# Patient Record
Sex: FEMALE | Race: BLACK OR AFRICAN AMERICAN | Employment: PART TIME | URBAN - METROPOLITAN AREA
[De-identification: names, ages, dates, MRNs, and addresses within clinical notes are randomized per-mention and may not be internally consistent; named-entity substitution may affect disease eponyms.]

---

## 2017-09-01 ENCOUNTER — OP HISTORICAL/CONVERTED ENCOUNTER (OUTPATIENT)
Dept: OTHER | Age: 72
End: 2017-09-01

## 2018-09-27 ENCOUNTER — OP HISTORICAL/CONVERTED ENCOUNTER (OUTPATIENT)
Dept: OTHER | Age: 73
End: 2018-09-27

## 2018-10-08 ENCOUNTER — OP HISTORICAL/CONVERTED ENCOUNTER (OUTPATIENT)
Dept: OTHER | Age: 73
End: 2018-10-08

## 2018-11-06 ENCOUNTER — OP HISTORICAL/CONVERTED ENCOUNTER (OUTPATIENT)
Dept: OTHER | Age: 73
End: 2018-11-06

## 2019-02-05 ENCOUNTER — OP HISTORICAL/CONVERTED ENCOUNTER (OUTPATIENT)
Dept: OTHER | Age: 74
End: 2019-02-05

## 2019-11-27 ENCOUNTER — OP HISTORICAL/CONVERTED ENCOUNTER (OUTPATIENT)
Dept: OTHER | Age: 74
End: 2019-11-27

## 2020-07-15 VITALS
DIASTOLIC BLOOD PRESSURE: 74 MMHG | HEIGHT: 62 IN | HEART RATE: 72 BPM | BODY MASS INDEX: 26.83 KG/M2 | WEIGHT: 145.8 LBS | SYSTOLIC BLOOD PRESSURE: 132 MMHG

## 2020-07-15 PROBLEM — N95.1 MENOPAUSAL SYNDROME: Status: ACTIVE | Noted: 2020-07-15
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
## 2020-07-15 PROBLEM — Z90.710 H/O TOTAL HYSTERECTOMY: Status: ACTIVE | Noted: 2020-07-15

## 2020-07-15 RX ORDER — DOXYCYCLINE 100 MG/1
100 CAPSULE ORAL 2 TIMES DAILY
COMMUNITY
End: 2022-05-11 | Stop reason: ALTCHOICE

## 2020-07-15 RX ORDER — FOLIC ACID 1 MG/1
TABLET ORAL DAILY
COMMUNITY
End: 2022-05-11 | Stop reason: ALTCHOICE

## 2020-07-15 RX ORDER — IRBESARTAN AND HYDROCHLOROTHIAZIDE 150; 12.5 MG/1; MG/1
TABLET, FILM COATED ORAL
COMMUNITY

## 2020-07-15 RX ORDER — ERGOCALCIFEROL 1.25 MG/1
50000 CAPSULE ORAL
COMMUNITY
End: 2022-06-08 | Stop reason: ALTCHOICE

## 2020-07-15 RX ORDER — ATORVASTATIN CALCIUM 20 MG/1
TABLET, FILM COATED ORAL DAILY
COMMUNITY
End: 2022-03-08 | Stop reason: SDUPTHER

## 2020-07-15 RX ORDER — ALLOPURINOL 100 MG/1
TABLET ORAL DAILY
COMMUNITY

## 2020-07-15 RX ORDER — METFORMIN HYDROCHLORIDE 500 MG/1
TABLET ORAL 2 TIMES DAILY WITH MEALS
COMMUNITY
End: 2021-07-08 | Stop reason: ALTCHOICE

## 2020-07-15 RX ORDER — OMEPRAZOLE 40 MG/1
40 CAPSULE, DELAYED RELEASE ORAL DAILY
COMMUNITY
End: 2022-05-11

## 2020-07-15 RX ORDER — GLIMEPIRIDE 4 MG/1
TABLET ORAL
COMMUNITY
End: 2021-05-24 | Stop reason: ALTCHOICE

## 2020-07-15 RX ORDER — ESTRADIOL 1 MG/1
1 TABLET ORAL DAILY
COMMUNITY
End: 2022-05-11 | Stop reason: ALTCHOICE

## 2020-07-15 RX ORDER — AMLODIPINE BESYLATE 5 MG/1
5 TABLET ORAL DAILY
COMMUNITY

## 2020-07-15 RX ORDER — DICLOFENAC SODIUM 50 MG/1
TABLET, DELAYED RELEASE ORAL 2 TIMES DAILY
COMMUNITY
End: 2022-05-11

## 2020-07-15 RX ORDER — DULAGLUTIDE 0.75 MG/.5ML
0.75 INJECTION, SOLUTION SUBCUTANEOUS
COMMUNITY
End: 2021-05-24 | Stop reason: DRUGHIGH

## 2020-08-25 ENCOUNTER — IP HISTORICAL/CONVERTED ENCOUNTER (OUTPATIENT)
Dept: OTHER | Age: 75
End: 2020-08-25

## 2021-03-31 ENCOUNTER — HOSPITAL ENCOUNTER (OUTPATIENT)
Dept: CT IMAGING | Age: 76
Discharge: HOME | End: 2021-03-31
Attending: INTERNAL MEDICINE
Payer: MEDICARE

## 2021-03-31 ENCOUNTER — TRANSCRIBE ORDER (OUTPATIENT)
Dept: SCHEDULING | Age: 76
End: 2021-03-31

## 2021-03-31 DIAGNOSIS — R10.32 ABDOMINAL PAIN, ACUTE, LEFT LOWER QUADRANT: ICD-10-CM

## 2021-03-31 DIAGNOSIS — R10.32 ABDOMINAL PAIN, ACUTE, LEFT LOWER QUADRANT: Primary | ICD-10-CM

## 2021-03-31 PROCEDURE — 74176 CT ABD & PELVIS W/O CONTRAST: CPT

## 2021-04-19 ENCOUNTER — OFFICE VISIT (OUTPATIENT)
Dept: OBGYN CLINIC | Age: 76
End: 2021-04-19
Payer: MEDICARE

## 2021-04-19 VITALS
HEIGHT: 62 IN | SYSTOLIC BLOOD PRESSURE: 150 MMHG | WEIGHT: 142.13 LBS | BODY MASS INDEX: 26.16 KG/M2 | DIASTOLIC BLOOD PRESSURE: 59 MMHG

## 2021-04-19 DIAGNOSIS — Z01.419 ROUTINE GYNECOLOGICAL EXAMINATION: Primary | ICD-10-CM

## 2021-04-19 DIAGNOSIS — Z12.31 ENCOUNTER FOR SCREENING MAMMOGRAM FOR MALIGNANT NEOPLASM OF BREAST: ICD-10-CM

## 2021-04-19 PROCEDURE — G8427 DOCREV CUR MEDS BY ELIG CLIN: HCPCS | Performed by: OBSTETRICS & GYNECOLOGY

## 2021-04-19 PROCEDURE — G8432 DEP SCR NOT DOC, RNG: HCPCS | Performed by: OBSTETRICS & GYNECOLOGY

## 2021-04-19 PROCEDURE — G0101 CA SCREEN;PELVIC/BREAST EXAM: HCPCS | Performed by: OBSTETRICS & GYNECOLOGY

## 2021-04-19 PROCEDURE — G8419 CALC BMI OUT NRM PARAM NOF/U: HCPCS | Performed by: OBSTETRICS & GYNECOLOGY

## 2021-04-19 PROCEDURE — G8536 NO DOC ELDER MAL SCRN: HCPCS | Performed by: OBSTETRICS & GYNECOLOGY

## 2021-04-19 NOTE — PROGRESS NOTES
Luisa Murguia is a 70 y.o. female who presents today for the following:  Chief Complaint   Patient presents with    Annual Exam    Pelvic Pain        No Known Allergies    Current Outpatient Medications   Medication Sig    allopurinoL (ZYLOPRIM) 100 mg tablet Take  by mouth daily.  amLODIPine (NORVASC) 5 mg tablet Take 5 mg by mouth daily.  atorvastatin (LIPITOR) 20 mg tablet Take  by mouth daily.  diclofenac EC (VOLTAREN) 50 mg EC tablet Take  by mouth two (2) times a day.  doxycycline (VIBRAMYCIN) 100 mg capsule Take 100 mg by mouth two (2) times a day.  ergocalciferol (Vitamin D2) 1,250 mcg (50,000 unit) capsule Take 50,000 Units by mouth.  estradioL (ESTRACE) 1 mg tablet Take 1 mg by mouth daily.  folic acid (FOLVITE) 1 mg tablet Take  by mouth daily.  glimepiride (AMARYL) 4 mg tablet Take  by mouth every morning.  irbesartan-hydroCHLOROthiazide (AVALIDE) 150-12.5 mg per tablet Take  by mouth.  metFORMIN (GLUCOPHAGE) 500 mg tablet Take  by mouth two (2) times daily (with meals).  omeprazole (PRILOSEC) 40 mg capsule Take 40 mg by mouth daily.  dulaglutide (Trulicity) 9.83 YQ/2.9 mL sub-q pen 0.75 mg by SubCUTAneous route every seven (7) days. No current facility-administered medications for this visit.         Past Medical History:   Diagnosis Date    Arthritis     Diabetes ([address]     Gout     High cholesterol        Past Surgical History:   Procedure Laterality Date    HX GI      HX HYSTERECTOMY         Family History   Problem Relation Age of Onset    Cancer Mother     Cancer Sister        Social History     Socioeconomic History    Marital status:      Spouse name: Not on file    Number of children: Not on file    Years of education: Not on file    Highest education level: Not on file   Occupational History    Not on file   Social Needs    Financial resource strain: Not on file    Food insecurity     Worry: Not on file     Inability: Not on file   [address] [place] [address] Transportation needs     Medical: Not on file     Non-medical: Not on file   Tobacco Use    Smoking status: Never Smoker    Smokeless tobacco: Never Used   Substance and Sexual Activity    Alcohol use: Not on file    Drug use: Not on file    Sexual activity: Yes   Lifestyle    Physical activity     Days per week: Not on file     Minutes per session: Not on file    Stress: Not on file   Relationships    Social connections     Talks on phone: Not on file     Gets together: Not on file     Attends Zoroastrianism service: Not on file     Active member of club or organization: Not on file     Attends meetings of clubs or organizations: Not on file     Relationship status: Not on file    Intimate partner violence     Fear of current or ex partner: Not on file     Emotionally abused: Not on file     Physically abused: Not on file     Forced sexual activity: Not on file   Other Topics Concern    Not on file   Social History Narrative    Not on file         HPI  Here for annual exam.  Patient has history of hysterectomy. She also has history of bowel obstruction requiring surgery. ROS   Review of Systems   Constitutional: Negative. HENT: Negative. Eyes: Negative. Respiratory: Negative. Cardiovascular: Negative. Gastrointestinal: Negative. Genitourinary: Negative. Musculoskeletal: Negative. Skin: Negative. Neurological: Negative. Endo/Heme/Allergies: Negative. Psychiatric/Behavioral: Negative.       BP (!) 150/59 (BP 1 Location: Left upper arm, BP Patient Position: Sitting)   Ht 5' 2\" (1.575 m)   Wt 142 lb 2 oz (64.5 kg)   BMI 26.00 kg/m²    OBGyn Exam   Constitutional  · Appearance: well-nourished, well developed, alert, in no acute distress    HENT  · Head and Face: appears normal    Neck  · Inspection/Palpation: normal appearance, no masses or tenderness  · Lymph Nodes: no lymphadenopathy present  · Thyroid: gland size normal, nontender, no nodules or masses present on palpation    Breasts   Symmetric, no palpable masses, no tenderness, no skin changes, no nipple abnormality, no nipple discharge, no lymphadenopathy. Chest  · Respiratory Effort: breathing labored  · Auscultation: normal breath sounds    Cardiovascular  · Heart:  · Auscultation: regular rate and rhythm without murmur    Gastrointestinal  · Abdominal Examination: abdomen non-tender to palpation, normal bowel sounds, no masses present  · Liver and spleen: no hepatomegaly present, spleen not palpable  · Hernias: no hernias identified    Genitourinary  · External Genitalia: normal appearance for age, no discharge present, no tenderness present, no inflammatory lesions present, no masses present, no atrophy present  · Vagina: normal vaginal vault without central or paravaginal defects, no discharge present, no inflammatory lesions present, no masses present  · Bladder: non-tender to palpation  · Urethra: appears normal  · Cervix: Absent   · Uterus: Absent  · Adnexa: no adnexal tenderness present, no adnexal masses present  · Perineum: perineum within normal limits, no evidence of trauma, no rashes or skin lesions present  · Anus: anus within normal limits, no hemorrhoids present  · Inguinal Lymph Nodes: no lymphadenopathy present    Skin  · General Inspection: no rash, no lesions identified    Neurologic/Psychiatric  · Mental Status:  · Orientation: grossly oriented to person, place and time  · Mood and Affect: mood normal, affect appropriate    No results found for this visit on 04/19/21. Orders Placed This Encounter    [facility] 3D WAQAR W MAMMO BI SCREENING INCL CAD     Standing Status:   Future     Standing Expiration Date:   4/19/2022     Order Specific Question:   Reason for Exam     Answer:   Screening         1. Routine gynecological examination  Reviewed Pap smear/HPV, mammogram, bone density colonoscopy testing guidelines. Encouraged healthy lifestyle. Discussed importanceof getting annual exams.   Discussed the risk of osteoporosis and recommended 1200 to 1500 mg of calcium as well as vitamin D supplementation daily. Recommended monthly self breast exams and instructed and demonstrated to the patient on the proper technique educated on the importance of healthy weight management and the significance of not smoking. 2. Encounter for screening mammogram for malignant neoplasm of breast    - [facility] 3D WAQAR W MAMMO BI SCREENING INCL CAD;  Future        Follow-up and Dispositions    · Return in about 2 years (around 4/19/2023) for Annual Exam.

## 2021-04-20 ENCOUNTER — TELEPHONE (OUTPATIENT)
Dept: OBGYN CLINIC | Age: 76
End: 2021-04-20

## 2021-04-20 NOTE — TELEPHONE ENCOUNTER
I called the patient because I received a refill request for her Estradiol. Dr Rodríguez saw her yesterday and she forgot to mention her medication. Her last refill was in November from Dr Nahomy Slater and directions stated to take 1 pill daily for 1 month, then decrease to 1/2 pill daily for 1 month, then discontinue completely. I explained that this medication is not usually prescribed at her age for long term use. Patient stated that she has been taking the 1/2 pill everyday and her last dose was yesterday. I asked the patient to read directions from her prescription bottle, which stated the same directions. I told the patient that we could not refill the medication, but to call us if she starts with any symptoms and we will address it at that time. Patient understood.

## 2021-04-23 ENCOUNTER — HOSPITAL ENCOUNTER (OUTPATIENT)
Dept: MAMMOGRAPHY | Age: 76
Discharge: HOME | End: 2021-04-23
Attending: OBSTETRICS & GYNECOLOGY
Payer: MEDICARE

## 2021-04-23 DIAGNOSIS — Z12.31 ENCOUNTER FOR SCREENING MAMMOGRAM FOR MALIGNANT NEOPLASM OF BREAST: ICD-10-CM

## 2021-04-23 PROCEDURE — 77063 BREAST TOMOSYNTHESIS BI: CPT

## 2021-05-10 ENCOUNTER — OFFICE VISIT (OUTPATIENT)
Dept: INFECTIOUS DISEASES | Age: 76
End: 2021-05-10
Payer: MEDICARE

## 2021-05-10 VITALS
HEART RATE: 63 BPM | RESPIRATION RATE: 15 BRPM | HEIGHT: 62 IN | OXYGEN SATURATION: 99 % | SYSTOLIC BLOOD PRESSURE: 130 MMHG | DIASTOLIC BLOOD PRESSURE: 82 MMHG | BODY MASS INDEX: 26.13 KG/M2 | WEIGHT: 142 LBS | TEMPERATURE: 97.3 F

## 2021-05-10 DIAGNOSIS — R76.12 (QFT) QUANTIFERON-TB TEST REACTION WITHOUT ACTIVE TUBERCULOSIS: Primary | ICD-10-CM

## 2021-05-10 PROCEDURE — G8510 SCR DEP NEG, NO PLAN REQD: HCPCS | Performed by: INTERNAL MEDICINE

## 2021-05-10 PROCEDURE — 1101F PT FALLS ASSESS-DOCD LE1/YR: CPT | Performed by: INTERNAL MEDICINE

## 2021-05-10 PROCEDURE — G8536 NO DOC ELDER MAL SCRN: HCPCS | Performed by: INTERNAL MEDICINE

## 2021-05-10 PROCEDURE — 1090F PRES/ABSN URINE INCON ASSESS: CPT | Performed by: INTERNAL MEDICINE

## 2021-05-10 PROCEDURE — G9899 SCRN MAM PERF RSLTS DOC: HCPCS | Performed by: INTERNAL MEDICINE

## 2021-05-10 PROCEDURE — 99204 OFFICE O/P NEW MOD 45 MIN: CPT | Performed by: INTERNAL MEDICINE

## 2021-05-10 PROCEDURE — 3017F COLORECTAL CA SCREEN DOC REV: CPT | Performed by: INTERNAL MEDICINE

## 2021-05-10 PROCEDURE — G8400 PT W/DXA NO RESULTS DOC: HCPCS | Performed by: INTERNAL MEDICINE

## 2021-05-10 PROCEDURE — G8427 DOCREV CUR MEDS BY ELIG CLIN: HCPCS | Performed by: INTERNAL MEDICINE

## 2021-05-10 PROCEDURE — G8419 CALC BMI OUT NRM PARAM NOF/U: HCPCS | Performed by: INTERNAL MEDICINE

## 2021-05-10 RX ORDER — GLIPIZIDE 5 MG/1
TABLET ORAL
COMMUNITY
Start: 2021-04-05 | End: 2021-07-08 | Stop reason: ALTCHOICE

## 2021-05-10 RX ORDER — FENOFIBRATE 160 MG/1
TABLET ORAL
COMMUNITY
Start: 2021-04-12

## 2021-05-10 RX ORDER — ISONIAZID 300 MG/1
300 TABLET ORAL DAILY
Qty: 90 TAB | Refills: 2 | Status: SHIPPED | OUTPATIENT
Start: 2021-05-10 | End: 2022-02-04

## 2021-05-10 RX ORDER — LANOLIN ALCOHOL/MO/W.PET/CERES
50 CREAM (GRAM) TOPICAL DAILY
Qty: 90 TAB | Refills: 2 | Status: SHIPPED | OUTPATIENT
Start: 2021-05-10 | End: 2021-05-24 | Stop reason: ALTCHOICE

## 2021-05-10 NOTE — PROGRESS NOTES
Subjective  Rio Lind is a 70 y.o. female. HPI   Patient with severe arthritis affecting primarily her hands/wrists and shoulders, referred from Rheumatology because of plans to treat with DMARD (immunosuppresive) given her positive Quantiferon TB test.  Patient states that she tested positive (PPD) in 1965 as part of pregnancy screening. She subsequently worked for [employer] and was required to get an annual CXR which presumably were always normal. She denies ever being treated with Isoniazid in the past.  She has no cough, sputum production, fever, night sweats. She apparently has been followed by Pulmonary Clinic, Dr. Sally Ballesteros, for LLL granuloma, but again reportedly not given Isoniazid. Reviewed recent labs and LFTs are normal.  Review of Systems   Constitutional: Negative for chills, diaphoresis and fever. Respiratory: Negative for cough, hemoptysis, sputum production and shortness of breath. Musculoskeletal: Positive for joint pain. Negative for myalgias. Skin: Negative for rash. Neurological: Negative for tingling and weakness. Psychiatric/Behavioral: Negative.       Past Medical History:   Diagnosis Date    Arthritis     Diabetes ([address]     Gout     High cholesterol      Past Surgical History:   Procedure Laterality Date    HX BREAST BIOPSY Right 10 yrs ago    benign    HX GI      HX HYSTERECTOMY       Family History   Problem Relation Age of Onset    Cancer Mother     Breast Cancer Sister     Ovarian Cancer Sister      Social History     Socioeconomic History    Marital status:      Spouse name: Not on file    Number of children: Not on file    Years of education: Not on file    Highest education level: Not on file   Occupational History    Not on file   Social Needs    Financial resource strain: Not on file    Food insecurity     Worry: Not on file     Inability: Not on file    Transportation needs     Medical: Not on file     Non-medical: Not on file Tobacco Use    Smoking status: Never Smoker    Smokeless tobacco: Never Used   Substance and Sexual Activity    Alcohol use: Not on file    Drug use: Not on file    Sexual activity: Yes   Lifestyle    Physical activity     Days per week: Not on file     Minutes per session: Not on file    Stress: Not on file   Relationships    Social connections     Talks on phone: Not on file     Gets together: Not on file     Attends Catholic service: Not on file     Active member of club or organization: Not on file     Attends meetings of clubs or organizations: Not on file     Relationship status: Not on file    Intimate partner violence     Fear of current or ex partner: Not on file     Emotionally abused: Not on file     Physically abused: Not on file     Forced sexual activity: Not on file   Other Topics Concern    Not on file   Social History Narrative    Not on file     Objective  Physical Exam  Vitals signs and nursing note reviewed. Constitutional:       Appearance: Normal appearance. She is not ill-appearing. HENT:      Head: Normocephalic and atraumatic. Nose: Nose normal.   Eyes:      Pupils: Pupils are equal, round, and reactive to light. Neck:      Musculoskeletal: Neck supple. Cardiovascular:      Rate and Rhythm: Normal rate and regular rhythm. Heart sounds: No murmur. Pulmonary:      Effort: Pulmonary effort is normal.      Breath sounds: Normal breath sounds. Abdominal:      General: Abdomen is flat. Bowel sounds are normal.      Palpations: Abdomen is soft. Musculoskeletal:      Right lower leg: No edema. Left lower leg: No edema. Skin:     Findings: No rash. Neurological:      General: No focal deficit present. Mental Status: She is alert and oriented to person, place, and time. Psychiatric:         Mood and Affect: Mood normal.         Behavior: Behavior normal.         Thought Content:  Thought content normal.         Judgment: Judgment normal. Assessment & Plan  1. 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 Plans for Disease-modifying anti-rheumatic drugs (DMARDS) with expected immunosuppression  3. Inflammatory polyarthritis, hands and wrists    1. Explained to patient that the positive TB skin test and positive Quantiferon TB assay indicate that she has inactive infection with tuberculosis but this could be reactivated causing pneumonia if her immune system is weakened by medications used to treat her arthritis  2. Discussed treatment with Isoniazid 300 mg daily for 9 months and discussed possible side effects including hepatitis and neuropathy  3. Start Pyroxidine to be taken daily along with INH  4. Follow-up here in 4 weeks for re-evaluation and to check her LFTs; instructed to discontinue INH if she develops nausea/vomiting/abdominal pain and contact the Clinic  5.  Will confer with Rheumatology, Dr. Aaron Mccrary, regarding timeline for safely starting DMARDS and with her Pulmonologist Dr. Velma Tellez MD

## 2021-05-24 ENCOUNTER — OFFICE VISIT (OUTPATIENT)
Dept: ENDOCRINOLOGY | Age: 76
End: 2021-05-24
Payer: MEDICARE

## 2021-05-24 VITALS
SYSTOLIC BLOOD PRESSURE: 162 MMHG | WEIGHT: 143 LBS | OXYGEN SATURATION: 98 % | DIASTOLIC BLOOD PRESSURE: 70 MMHG | TEMPERATURE: 98.4 F | HEIGHT: 62 IN | HEART RATE: 62 BPM | BODY MASS INDEX: 26.31 KG/M2

## 2021-05-24 DIAGNOSIS — E11.22 CKD STAGE 3 DUE TO TYPE 2 DIABETES MELLITUS (HCC): ICD-10-CM

## 2021-05-24 DIAGNOSIS — N18.30 CKD STAGE 3 DUE TO TYPE 2 DIABETES MELLITUS (HCC): ICD-10-CM

## 2021-05-24 DIAGNOSIS — E11.65 UNCONTROLLED TYPE 2 DIABETES MELLITUS WITH HYPERGLYCEMIA (HCC): Primary | ICD-10-CM

## 2021-05-24 PROBLEM — M06.4 INFLAMMATORY POLYARTHRITIS (HCC): Status: ACTIVE | Noted: 2021-05-24

## 2021-05-24 PROBLEM — E78.2 MIXED HYPERLIPIDEMIA: Status: ACTIVE | Noted: 2021-05-24

## 2021-05-24 PROBLEM — I12.9 HYPERTENSIVE RENAL DISEASE: Status: ACTIVE | Noted: 2021-05-24

## 2021-05-24 LAB
GLUCOSE POC: 202 MG/DL
HBA1C MFR BLD HPLC: 8.6 %

## 2021-05-24 PROCEDURE — G8400 PT W/DXA NO RESULTS DOC: HCPCS | Performed by: INTERNAL MEDICINE

## 2021-05-24 PROCEDURE — 3052F HG A1C>EQUAL 8.0%<EQUAL 9.0%: CPT | Performed by: INTERNAL MEDICINE

## 2021-05-24 PROCEDURE — 99204 OFFICE O/P NEW MOD 45 MIN: CPT | Performed by: INTERNAL MEDICINE

## 2021-05-24 PROCEDURE — 3017F COLORECTAL CA SCREEN DOC REV: CPT | Performed by: INTERNAL MEDICINE

## 2021-05-24 PROCEDURE — 83036 HEMOGLOBIN GLYCOSYLATED A1C: CPT | Performed by: INTERNAL MEDICINE

## 2021-05-24 PROCEDURE — G9899 SCRN MAM PERF RSLTS DOC: HCPCS | Performed by: INTERNAL MEDICINE

## 2021-05-24 PROCEDURE — 1090F PRES/ABSN URINE INCON ASSESS: CPT | Performed by: INTERNAL MEDICINE

## 2021-05-24 PROCEDURE — 2022F DILAT RTA XM EVC RTNOPTHY: CPT | Performed by: INTERNAL MEDICINE

## 2021-05-24 PROCEDURE — 82962 GLUCOSE BLOOD TEST: CPT | Performed by: INTERNAL MEDICINE

## 2021-05-24 PROCEDURE — G8536 NO DOC ELDER MAL SCRN: HCPCS | Performed by: INTERNAL MEDICINE

## 2021-05-24 PROCEDURE — G8419 CALC BMI OUT NRM PARAM NOF/U: HCPCS | Performed by: INTERNAL MEDICINE

## 2021-05-24 PROCEDURE — G8427 DOCREV CUR MEDS BY ELIG CLIN: HCPCS | Performed by: INTERNAL MEDICINE

## 2021-05-24 PROCEDURE — 1101F PT FALLS ASSESS-DOCD LE1/YR: CPT | Performed by: INTERNAL MEDICINE

## 2021-05-24 PROCEDURE — G8510 SCR DEP NEG, NO PLAN REQD: HCPCS | Performed by: INTERNAL MEDICINE

## 2021-05-24 RX ORDER — PEN NEEDLE, DIABETIC 31 GX5/16"
NEEDLE, DISPOSABLE MISCELLANEOUS
COMMUNITY
Start: 2021-04-05 | End: 2021-09-08 | Stop reason: ALTCHOICE

## 2021-05-24 RX ORDER — BLOOD SUGAR DIAGNOSTIC
STRIP MISCELLANEOUS
COMMUNITY
Start: 2021-05-20

## 2021-05-24 RX ORDER — PYRIDOXINE HCL (VITAMIN B6) 50 MG
CAPSULE ORAL
COMMUNITY
Start: 2021-05-14 | End: 2022-05-11

## 2021-05-24 RX ORDER — DULAGLUTIDE 1.5 MG/.5ML
1.5 INJECTION, SOLUTION SUBCUTANEOUS
Qty: 4 SYRINGE | Refills: 3 | Status: SHIPPED | OUTPATIENT
Start: 2021-05-24 | End: 2021-06-21

## 2021-05-24 NOTE — PATIENT INSTRUCTIONS
Decrease Toujeo to 40 units at bedtime    Increase Trulicity to 1.5 mg weekly    Do not take glipizide at all    Cut back on bread, rice, pasta, potato, sweet potato, corn, bananas, grapes, watermelon, ppineapples, fruit cups, fruit punch

## 2021-05-24 NOTE — LETTER
5/24/2021 Patient: Tyrone Monroe YOB: 1945 Date of Visit: 5/24/2021 Rochelle Fuentes MD 
[address] [place] [id number] Via Fax: [phone] Dear Rochelle Fuentes MD, Thank you for referring Ms. Tyrone Monroe to [address] [place] [address] for evaluation. My notes for this consultation are attached. If you have questions, please do not hesitate to call me. I look forward to following your patient along with you. Sincerely, Sonali Ororuke MD

## 2021-05-24 NOTE — PROGRESS NOTES
History and Physical    Patient: Gill Malhotra MRN: [id number]  SSN: [id number]    YOB: 1945  Age: 70 y.o. Sex: female      Subjective:      Gill Malhotra is a 70 y.o. female with past medical history of hypertension, hyperlipidemia, inflammatory arthritis is sent to me by primary care physician Dr. Tanika Braun for type 2 diabetes mellitus. Patient was diagnosed with diabetes a long time back. She was on Metformin and glimepiride since a long time. A few months back it was noted that her kidney function had declined, so Metformin and glimepiride were stopped. She was started on Trulicity 8.59 mg weekly, Toujeo 44 units at bedtime. She also has a prescription for glipizide 5 mg to be taken as needed if her blood sugar is high. She checks blood glucose 3 times a day. Fasting readings are staying in the 80s and 90s, before lunch and before dinner glucose starts to go high. She eats 3 meals per day, she has stopped drinking all sugary beverages. She walks on treadmill for exercise. Did not bring glucometer today. Up-to-date with diabetic eye exam.  She has inflammatory arthritis for which he sees Dr. Annabel Hawkins. She was given injection in her wrist and shoulder 3 months back. She does not get steroids frequently.     Glucometer reading: Did not bring glucometer today    · Diagnosis: long time  · Current treatment: Trulicity 3.06 mg weekly, glipizide 5 mg every day as needed, Toujeo 44 units at night (started 3-2021)  · Past treatment: glimepiride, metformin  · Glucose checks: 3 times a day, fasting 80-90s, lunch: 200s, dinner: 300s,  · Hyperglycemia: yes  · Hypoglycemia: no  · Meals per day: 3, breakfast: sandwich, lunch: salad and ham/chicken, dinner: chicken, potatoes, snacks: crackers,   · Exercise: yes treadmill   · DM related hospitalizations: no    Complications of DM:  · CAD: no  · CVA: no  · PVD: no  · Amputations: no   · Retinopathy: no; last exam was 8-2020  · Gastropathy: no  · Nephropathy: ckd stage 3  · Neuropathy: no    Medications:  · Statin: atorvastatin 20, fenofibrate 160 mg  · ACE-I: no  · ASA: yes    · Diabetes education: no    Past Medical History:   Diagnosis Date    Arthritis     Diabetes ([address]     Gout     High cholesterol      Past Surgical History:   Procedure Laterality Date    HX BREAST BIOPSY Right 10 yrs ago    benign    HX GI      HX HYSTERECTOMY        Family History   Problem Relation Age of Onset    Cancer Mother     Breast Cancer Sister     Ovarian Cancer Sister     Diabetes Father      Social History     Tobacco Use    Smoking status: Never Smoker    Smokeless tobacco: Never Used   Substance Use Topics    Alcohol use: Not Currently      Prior to Admission medications    Medication Sig Start Date End Date Taking? Authorizing Provider   Vitamin B-6 50 mg cap TAKE 1 TABLET BY MOUTH DAILY 5/14/21  Yes Provider, Historical   Accu-Chek Guide test strips strip  5/20/21  Yes Provider, Historical   BD Ultra-Fine Short Pen Needle 31 gauge x 5/16\" ndle USE AS DIRECTED EVERY DAY 4/5/21  Yes Provider, Historical   dulaglutide (Trulicity) 1.5 LH/9.6 mL sub-q pen 0.5 mL by SubCUTAneous route every seven (7) days for 28 days. 5/24/21 6/21/21 Yes Nolan Andrews MD   fenofibrate (LOFIBRA) 160 mg tablet TAKE 1 TABLET BY MOUTH AT BEDTIME 4/12/21  Yes Provider, Historical   glipiZIDE (GLUCOTROL) 5 mg tablet TAKE 1 TABLET BY MOUTH THREE TIMES DAILY BEFORE MEALS 4/5/21  Yes Provider, Historical   isoniazid (NYDRAZID) 300 mg tablet Take 1 Tab by mouth daily for 270 days. Indications: treatment to prevent active tuberculosis infection 5/10/21 2/4/22 Yes Tae Marks MD   allopurinoL (ZYLOPRIM) 100 mg tablet Take  by mouth daily. Yes Provider, Historical   amLODIPine (NORVASC) 5 mg tablet Take 5 mg by mouth daily. Yes Provider, Historical   atorvastatin (LIPITOR) 20 mg tablet Take  by mouth daily.    Yes Provider, Historical   diclofenac EC (VOLTAREN) 50 mg EC tablet Take  by mouth two (2) times a day. Yes Provider, Historical   doxycycline (VIBRAMYCIN) 100 mg capsule Take 100 mg by mouth two (2) times a day. Yes Provider, Historical   ergocalciferol (Vitamin D2) 1,250 mcg (50,000 unit) capsule Take 50,000 Units by mouth. Yes Provider, Historical   estradioL (ESTRACE) 1 mg tablet Take 1 mg by mouth daily. Yes Provider, Historical   folic acid (FOLVITE) 1 mg tablet Take  by mouth daily. Yes Provider, Historical   irbesartan-hydroCHLOROthiazide (AVALIDE) 150-12.5 mg per tablet Take  by mouth. Yes Provider, Historical   metFORMIN (GLUCOPHAGE) 500 mg tablet Take  by mouth two (2) times daily (with meals). Yes Provider, Historical   omeprazole (PRILOSEC) 40 mg capsule Take 40 mg by mouth daily. Yes Provider, Historical        No Known Allergies    Review of Systems:  ROS    A comprehensive review of systems was preformed and it is negative except hot flashes, chronic constipation, back pain    Objective:     Vitals:    05/24/21 0915 05/24/21 0932   BP: (!) 148/74 (!) 162/70   Pulse: 86 62   Temp: 98.4 °F (36.9 °C)    TempSrc: Temporal    SpO2: 98%    Weight: 143 lb (64.9 kg)    Height: 5' 2\" (1.575 m)         Physical Exam:    Physical Exam  Vitals and nursing note reviewed. Constitutional:       Appearance: Normal appearance. HENT:      Head: Normocephalic and atraumatic. Eyes:      Extraocular Movements: Extraocular movements intact. Pupils: Pupils are equal, round, and reactive to light. Cardiovascular:      Rate and Rhythm: Normal rate and regular rhythm. Pulmonary:      Effort: Pulmonary effort is normal.      Breath sounds: Normal breath sounds. Abdominal:      General: Bowel sounds are normal.      Palpations: Abdomen is soft. Musculoskeletal:         General: No swelling. Normal range of motion. Cervical back: Neck supple. Skin:     General: Skin is warm and dry. Neurological:      General: No focal deficit present.       Mental Status: She is alert and oriented to person, place, and time. Psychiatric:         Mood and Affect: Mood normal.         Behavior: Behavior normal.       diabetic foot exam:  Bilateral diabetic foot exam was performed today. Dorsalis pedis pulses 2+ bilaterally. Monofilament sensation normal bilaterally. No ulcers or skin breakdown. Labs and Imaging:    Last 3 Recorded Weights in this Encounter    05/24/21 0915   Weight: 143 lb (64.9 kg)        No results found for: HBA1C, HGBE8, ASE2LEOI, NSP2LHTP, BZH9HOZU     Assessment:     Patient Active Problem List   Diagnosis Code    Tick bite W57Carmela Vera H/O total hysterectomy Z90.710    Menopausal syndrome N95.1    Uncontrolled type 2 diabetes mellitus with hyperglycemia (HCC) E11.65    CKD stage 3 due to type 2 diabetes mellitus (HCC) E11.22, N18.30    Hypertensive renal disease I12.9    Mixed hyperlipidemia E78.2    Inflammatory polyarthritis ([facility] M06.4           Plan:     Type 2 diabetes mellitus, uncontrolled:  I reviewed progress note and labs from the referring provider's office. Hemoglobin A1c was 7% on 3-9-2021, 8.6% today. Fingerstick blood glucose is 202 mg/dL in my office today. Up to date with diabetes related annual labs: March 2021  Up to date with diabetic eye exam: August 2020  GFR was 41 in March 2021  Plan:  Discussed with patient importance of controlling diabetes to prevent progression of endorgan damage. Given patient's age and comorbidities, avoiding hypoglycemia is important. Discussed with patient about diabetic diet, discussed about cutting back on starches, sugary fruits, avoiding all sugary beverages. Since her fasting readings is going in the 80s and 90s, I will decrease Toujeo from 44 units to 40 units at bedtime. Increase Trulicity to 1.5 mg weekly. Advised patient to avoid taking glipizide. In future I will consider starting Farxiga/pioglitazone, and titrating down insulin.   Continue to check blood glucose 3 times a day and bring glucometer to next visit in 6 weeks. CKD stage III:  GFR 41 in 2021. Review of labs shows that since past 2 to 3 years kidney function has been declining. I will refer patient to nephrology. Mixed hyperlipidemia:  3-9-2021: Total cholesterol 183, triglycerides 98, . Currently on atorvastatin 20 mg and fenofibrate 160 mg, has been on these medications since a long time. I will continue these for now, given patient's age. Inflammatory polyarthritis:  Seeing rheumatologist.    Orders Placed This Encounter    REFERRAL TO NEPHROLOGY     Referral Priority:   Routine     Referral Type:   Consultation     Referral Reason:   Specialty Services Required     Referred to Provider:   Maryse Mayorga MD     Requested Specialty:   Nephrology     Number of Visits Requested:   1    AMB POC GLUCOSE BLOOD, BY GLUCOSE MONITORING DEVICE    AMB POC HEMOGLOBIN A1C    dulaglutide (Trulicity) 1.5 GR/0.7 mL sub-q pen     Si.5 mL by SubCUTAneous route every seven (7) days for 28 days.      Dispense:  4 Syringe     Refill:  3        Signed By: Gabriella Gamez MD     May 24, 2021      Return to clinic 6 weeks

## 2021-06-07 ENCOUNTER — OFFICE VISIT (OUTPATIENT)
Dept: INFECTIOUS DISEASES | Age: 76
End: 2021-06-07
Payer: MEDICARE

## 2021-06-07 VITALS
HEART RATE: 65 BPM | RESPIRATION RATE: 15 BRPM | WEIGHT: 142 LBS | HEIGHT: 62 IN | SYSTOLIC BLOOD PRESSURE: 128 MMHG | TEMPERATURE: 97.8 F | BODY MASS INDEX: 26.13 KG/M2 | OXYGEN SATURATION: 99 % | DIASTOLIC BLOOD PRESSURE: 76 MMHG

## 2021-06-07 DIAGNOSIS — Z22.7 TB LUNG, LATENT: Primary | ICD-10-CM

## 2021-06-07 DIAGNOSIS — R76.12 (QFT) QUANTIFERON-TB TEST REACTION WITHOUT ACTIVE TUBERCULOSIS: ICD-10-CM

## 2021-06-07 PROCEDURE — G8536 NO DOC ELDER MAL SCRN: HCPCS | Performed by: INTERNAL MEDICINE

## 2021-06-07 PROCEDURE — G9899 SCRN MAM PERF RSLTS DOC: HCPCS | Performed by: INTERNAL MEDICINE

## 2021-06-07 PROCEDURE — G8419 CALC BMI OUT NRM PARAM NOF/U: HCPCS | Performed by: INTERNAL MEDICINE

## 2021-06-07 PROCEDURE — 99213 OFFICE O/P EST LOW 20 MIN: CPT | Performed by: INTERNAL MEDICINE

## 2021-06-07 PROCEDURE — 3017F COLORECTAL CA SCREEN DOC REV: CPT | Performed by: INTERNAL MEDICINE

## 2021-06-07 PROCEDURE — 1101F PT FALLS ASSESS-DOCD LE1/YR: CPT | Performed by: INTERNAL MEDICINE

## 2021-06-07 PROCEDURE — G8400 PT W/DXA NO RESULTS DOC: HCPCS | Performed by: INTERNAL MEDICINE

## 2021-06-07 PROCEDURE — 1090F PRES/ABSN URINE INCON ASSESS: CPT | Performed by: INTERNAL MEDICINE

## 2021-06-07 PROCEDURE — G8427 DOCREV CUR MEDS BY ELIG CLIN: HCPCS | Performed by: INTERNAL MEDICINE

## 2021-06-07 PROCEDURE — G8510 SCR DEP NEG, NO PLAN REQD: HCPCS | Performed by: INTERNAL MEDICINE

## 2021-06-07 NOTE — PROGRESS NOTES
Vj Preciado is a 78 y.o. female. HPI   Patient followed after being started on Isoniazid for latent TB in anticipation for the use of DMARDS for her underlying inflammatory arthritis. She has reportedly been tolerating it well and returns for routine follow-up. Review of Systems   Constitutional: Negative for chills, diaphoresis, fever, malaise/fatigue and weight loss. Respiratory: Negative for cough, hemoptysis, sputum production and shortness of breath. Gastrointestinal: Negative for abdominal pain, nausea and vomiting. Skin: Negative for itching and rash. Neurological: Negative for tingling. Past Medical History:   Diagnosis Date    Arthritis     Diabetes ([address]     Gout     High cholesterol      Past Surgical History:   Procedure Laterality Date    HX BREAST BIOPSY Right 10 yrs ago    benign    HX GI      HX HYSTERECTOMY       Objective  Physical Exam  Vitals and nursing note reviewed. Constitutional:       Appearance: Normal appearance. She is not ill-appearing. Eyes:      Pupils: Pupils are equal, round, and reactive to light. Cardiovascular:      Rate and Rhythm: Normal rate and regular rhythm. Pulses: Normal pulses. Heart sounds: Normal heart sounds. Pulmonary:      Effort: Pulmonary effort is normal.      Breath sounds: Normal breath sounds. Abdominal:      General: Abdomen is flat. Bowel sounds are normal.      Palpations: Abdomen is soft. Tenderness: There is no abdominal tenderness. There is no guarding. Skin:     Coloration: Skin is not jaundiced. Findings: No rash. Neurological:      General: No focal deficit present. Mental Status: She is alert and oriented to person, place, and time. Psychiatric:         Mood and Affect: Mood normal.         Behavior: Behavior normal.         Thought Content: Thought content normal.         Judgment: Judgment normal.          Assessment & Plan  1.  Latent TB infection with remote positive PPD in 1965 and recent reactive Quantiferon TB assay, no evidence of active pulmonary TB, but history of lung granuloma, on Isoniazid chemoprophylaxis, first month with no apparent side effects  2. Plans for Disease-modifying anti-rheumatic drugs (DMARDS) with expected immunosuppression  3. Inflammatory polyarthritis, hands and wrists     Comment:  Most people develop hepatoxicity in the first 4 weeks of therapy, therefore, she does not require monthly LFTs, but should return if symptoms of GI discomfort appear. 1. Continue Isoniazid and Pyridoxine to complete 8 more months  2. Check with PCP regarding recent blood work to see if LFTs were included and if not, have labs drawn  3. Again instructed to discontinue INH if she develops nausea/vomiting/abdominal pain and contact the Clinic  4.  Will confer with Rheumatology, Dr. Dmitri Sampson, regarding timeline for safely starting DMARDS and with her Pulmonologist Dr. Alexia Roman MD

## 2021-07-08 ENCOUNTER — OFFICE VISIT (OUTPATIENT)
Dept: ENDOCRINOLOGY | Age: 76
End: 2021-07-08
Payer: MEDICARE

## 2021-07-08 VITALS
BODY MASS INDEX: 26.54 KG/M2 | HEIGHT: 62 IN | TEMPERATURE: 97.3 F | DIASTOLIC BLOOD PRESSURE: 72 MMHG | SYSTOLIC BLOOD PRESSURE: 133 MMHG | WEIGHT: 144.2 LBS | HEART RATE: 70 BPM | OXYGEN SATURATION: 98 %

## 2021-07-08 DIAGNOSIS — E11.65 UNCONTROLLED TYPE 2 DIABETES MELLITUS WITH HYPERGLYCEMIA (HCC): Primary | ICD-10-CM

## 2021-07-08 LAB — GLUCOSE POC: 142 MG/DL

## 2021-07-08 PROCEDURE — 3052F HG A1C>EQUAL 8.0%<EQUAL 9.0%: CPT | Performed by: INTERNAL MEDICINE

## 2021-07-08 PROCEDURE — 1101F PT FALLS ASSESS-DOCD LE1/YR: CPT | Performed by: INTERNAL MEDICINE

## 2021-07-08 PROCEDURE — 2022F DILAT RTA XM EVC RTNOPTHY: CPT | Performed by: INTERNAL MEDICINE

## 2021-07-08 PROCEDURE — G9899 SCRN MAM PERF RSLTS DOC: HCPCS | Performed by: INTERNAL MEDICINE

## 2021-07-08 PROCEDURE — G8419 CALC BMI OUT NRM PARAM NOF/U: HCPCS | Performed by: INTERNAL MEDICINE

## 2021-07-08 PROCEDURE — G8427 DOCREV CUR MEDS BY ELIG CLIN: HCPCS | Performed by: INTERNAL MEDICINE

## 2021-07-08 PROCEDURE — 99214 OFFICE O/P EST MOD 30 MIN: CPT | Performed by: INTERNAL MEDICINE

## 2021-07-08 PROCEDURE — 82962 GLUCOSE BLOOD TEST: CPT | Performed by: INTERNAL MEDICINE

## 2021-07-08 PROCEDURE — 1090F PRES/ABSN URINE INCON ASSESS: CPT | Performed by: INTERNAL MEDICINE

## 2021-07-08 PROCEDURE — 3017F COLORECTAL CA SCREEN DOC REV: CPT | Performed by: INTERNAL MEDICINE

## 2021-07-08 PROCEDURE — G8510 SCR DEP NEG, NO PLAN REQD: HCPCS | Performed by: INTERNAL MEDICINE

## 2021-07-08 PROCEDURE — G8400 PT W/DXA NO RESULTS DOC: HCPCS | Performed by: INTERNAL MEDICINE

## 2021-07-08 PROCEDURE — G8536 NO DOC ELDER MAL SCRN: HCPCS | Performed by: INTERNAL MEDICINE

## 2021-07-08 RX ORDER — MELATONIN
DAILY
COMMUNITY
End: 2022-05-11

## 2021-07-08 RX ORDER — IBUPROFEN 600 MG/1
TABLET ORAL
COMMUNITY
Start: 2021-06-28 | End: 2022-05-11

## 2021-07-08 NOTE — PROGRESS NOTES
History and Physical    Patient: Sydney Moreno MRN: [id number]  SSN: [id number]    YOB: 1945  Age: 78 y.o. Sex: female      Subjective:      Sydney Moreno is a 78 y.o. female with past medical history of hypertension, hyperlipidemia, inflammatory arthritis is here for follow-up of type 2 diabetes mellitus. She was sent to me by primary care physician Dr. Melony Bhatt.    At the last visit I increase Trulicity to 1.5 mg weekly, Toujeo was decreased from 44 units to 40 units at bedtime. Patient was educated about diabetic diet. She is trying to cut back on starches, eating more vegetables, not drinking any sugary beverages. She continues to walk on treadmill for exercise. At the last visit she was advised to stop taking glipizide which she was taking as needed. She has not taken any glipizide since the last visit. Checking blood glucose twice a day. She has some fasting readings in the 70s and 80s but she does not get symptomatic when that happens. She denies any symptoms of hypoglycemia during the day. At the last visit she was referred to nephrology. She has made appointment and seen them once. She is going to have kidney ultrasound done soon. Up-to-date with diabetic eye exam.  She has inflammatory arthritis for which he sees Dr. Donovan Becerra. She was given injection in her wrist and shoulder in feb 2021. She does not get steroids frequently. Glucometer reading: Checking blood glucose twice a day. Readings ranging from 74 to 265 mg/dL.     Updated diabetes history:  · Diagnosis: long time  · Current treatment: Trulicity 1.5 mg weekly, Toujeo 40 units at night (started 3-2021)  · Past treatment: glimepiride, metformin, glipizide  · Glucose checks: 3 times a day, fasting 80-90s, lunch: 200s, dinner: 300s,  · Hyperglycemia: yes  · Hypoglycemia: no  · Meals per day: 3, breakfast: sandwich, lunch: salad and ham/chicken, dinner: chicken, potatoes, snacks: crackers,   · Exercise: yes treadmill · DM related hospitalizations: no    Complications of DM:  · CAD: no  · CVA: no  · PVD: no  · Amputations: no   · Retinopathy: no; last exam was 8-2020  · Gastropathy: no  · Nephropathy: ckd stage 3  · Neuropathy: no    Medications:  · Statin: atorvastatin 20, fenofibrate 160 mg  · ACE-I: no  · ASA: yes    · Diabetes education: no    Past Medical History:   Diagnosis Date    Arthritis     Diabetes [address]     Gout     High cholesterol      Past Surgical History:   Procedure Laterality Date    HX BREAST BIOPSY Right 10 yrs ago    benign    HX GI      HX HYSTERECTOMY        Family History   Problem Relation Age of Onset    Cancer Mother     Breast Cancer Sister     Ovarian Cancer Sister     Diabetes Father      Social History     Tobacco Use    Smoking status: Never Smoker    Smokeless tobacco: Never Used   Substance Use Topics    Alcohol use: Not Currently      Prior to Admission medications    Medication Sig Start Date End Date Taking? Authorizing Provider   cholecalciferol (Vitamin D3) (1000 Units /25 mcg) tablet Take  by mouth daily. Yes Provider, Historical   Vitamin B-6 50 mg cap TAKE 1 TABLET BY MOUTH DAILY 5/14/21  Yes Provider, Historical   Accu-Chek Guide test strips strip  5/20/21  Yes Provider, Historical   BD Ultra-Fine Short Pen Needle 31 gauge x 5/16\" ndle USE AS DIRECTED EVERY DAY 4/5/21  Yes Provider, Historical   fenofibrate (LOFIBRA) 160 mg tablet TAKE 1 TABLET BY MOUTH AT BEDTIME 4/12/21  Yes Provider, Historical   isoniazid (NYDRAZID) 300 mg tablet Take 1 Tab by mouth daily for 270 days. Indications: treatment to prevent active tuberculosis infection 5/10/21 2/4/22 Yes Concetta Monday, MD   allopurinoL (ZYLOPRIM) 100 mg tablet Take  by mouth daily. Yes Provider, Historical   amLODIPine (NORVASC) 5 mg tablet Take 5 mg by mouth daily. Yes Provider, Historical   atorvastatin (LIPITOR) 20 mg tablet Take  by mouth daily.    Yes Provider, Historical   irbesartan-hydroCHLOROthiazide (AVALIDE) 150-12.5 mg per tablet Take  by mouth. Yes Provider, Historical   ibuprofen (MOTRIN) 600 mg tablet TAKE 1 TABLET BY MOUTH EVERY 6 HOURS AS NEEDED FOR PAIN. DO NOT COMBINE WITH ANY OTHER PAIN KILLERS 6/28/21   Provider, Historical   diclofenac EC (VOLTAREN) 50 mg EC tablet Take  by mouth two (2) times a day. Patient not taking: Reported on 7/8/2021    Provider, Historical   doxycycline (VIBRAMYCIN) 100 mg capsule Take 100 mg by mouth two (2) times a day. Patient not taking: Reported on 7/8/2021    Provider, Historical   ergocalciferol (Vitamin D2) 1,250 mcg (50,000 unit) capsule Take 50,000 Units by mouth. Patient not taking: Reported on 7/8/2021    Provider, Historical   estradioL (ESTRACE) 1 mg tablet Take 1 mg by mouth daily. Patient not taking: Reported on 7/8/2021    Provider, Historical   folic acid (FOLVITE) 1 mg tablet Take  by mouth daily. Patient not taking: Reported on 7/8/2021    Provider, Historical   omeprazole (PRILOSEC) 40 mg capsule Take 40 mg by mouth daily. Provider, Historical        No Known Allergies    Review of Systems:  ROS    A comprehensive review of systems was preformed and it is negative except mentioned in HPI    Objective:     Vitals:    07/08/21 0917   BP: 133/72   Pulse: 70   Temp: 97.3 °F (36.3 °C)   TempSrc: Temporal   SpO2: 98%   Weight: 144 lb 3.2 oz (65.4 kg)   Height: 5' 2\" (1.575 m)        Physical Exam:    Physical Exam  Vitals and nursing note reviewed. Constitutional:       Appearance: Normal appearance. HENT:      Head: Normocephalic and atraumatic. Cardiovascular:      Rate and Rhythm: Normal rate and regular rhythm. Pulmonary:      Effort: Pulmonary effort is normal.      Breath sounds: Normal breath sounds. Neurological:      Mental Status: She is alert. 5-:  diabetic foot exam:  Bilateral diabetic foot exam was performed today. Dorsalis pedis pulses 2+ bilaterally. Monofilament sensation normal bilaterally.   No ulcers or skin breakdown. Labs and Imaging:    Last 3 Recorded Weights in this Encounter    07/08/21 0917   Weight: 144 lb 3.2 oz (65.4 kg)        No results found for: HBA1C, [id number], QFR9KFSE, KZX9KVXO     Assessment:     Patient Active Problem List   Diagnosis Code    Tick bite W57. Ratna Beverly H/O total hysterectomy Z90.710    Menopausal syndrome N95.1    Uncontrolled type 2 diabetes mellitus with hyperglycemia (HCC) E11.65    CKD stage 3 due to type 2 diabetes mellitus (HCC) E11.22, N18.30    Hypertensive renal disease I12.9    Mixed hyperlipidemia E78.2    Inflammatory polyarth[address] ([facility] [address] M06.4           Plan:     Type 2 diabetes mellitus, uncontrolled:  Hemoglobin A1c was 7% on 3-9-2021, 8.6% on 5-. Fingerstick blood glucose is 142 mg/dL in my office today. Up to date with diabetes related annual labs: March 2021  Up to date with diabetic eye exam: August 2020  GFR was 41 in March 2021  Plan:  Given patient's age and comorbidities, avoiding hypoglycemia is important. Encourage patient to continue with diet and exercise. As patient is having fasting readings in the 70s and 80s, I am going to decrease Toujeo from 40 units to 38 units at bedtime. Continue Trulicity 1.5 mg weekly. Patient has a lot of sample medications given to her by PCP so she does not need any refills today. Continue to check blood glucose twice a day and I will see her back in 2 months. CKD stage III:  GFR 41 in March 2021. Review of labs shows that since past 2 to 3 years kidney function has been declining. Patient has established care with nephrology. Mixed hyperlipidemia:  3-9-2021: Total cholesterol 183, triglycerides 98, . Currently on atorvastatin 20 mg and fenofibrate 160 mg, has been on these medications since a long time. I will continue these for now, given patient's age.     Inflammatory polyarthritis:  Seeing rheumatologist.    Orders Placed This Encounter    AMB POC GLUCOSE BLOOD, BY GLUCOSE MONITORING DEVICE        Signed By: Diana Ni MD     July 8, 2021      Return to clinic 2 months

## 2021-07-08 NOTE — LETTER
7/8/2021    Patient: Mati Sebastian   YOB: 1945   Date of Visit: 7/8/2021     Felicita Mccormick MD  [address] [place] [address]  Via Fax: [phone]    Dear Felicita Mccormick MD,      Thank you for referring Ms. Mati Sebastian to [address] [place] [address] for evaluation. My notes for this consultation are attached. If you have questions, please do not hesitate to call me. I look forward to following your patient along with you.       Sincerely,    Lonnie Dutta MD

## 2021-08-13 ENCOUNTER — TELEPHONE (OUTPATIENT)
Dept: INFECTIOUS DISEASES | Age: 76
End: 2021-08-13

## 2021-08-13 NOTE — TELEPHONE ENCOUNTER
Patient came into the office and wants to know about what she needs to do about her Vitamin B-6 50 mg tablets as the drug store states they cannot get it anymore. Is she able to cut in half the nature made B6 100 mg tablets that are over the counter? Can you please give her a call @ [phone]?

## 2021-09-08 ENCOUNTER — OFFICE VISIT (OUTPATIENT)
Dept: ENDOCRINOLOGY | Age: 76
End: 2021-09-08
Payer: MEDICARE

## 2021-09-08 VITALS
SYSTOLIC BLOOD PRESSURE: 139 MMHG | HEART RATE: 59 BPM | HEIGHT: 62 IN | DIASTOLIC BLOOD PRESSURE: 76 MMHG | OXYGEN SATURATION: 97 % | WEIGHT: 149.9 LBS | BODY MASS INDEX: 27.58 KG/M2 | TEMPERATURE: 96.8 F

## 2021-09-08 DIAGNOSIS — E11.65 UNCONTROLLED TYPE 2 DIABETES MELLITUS WITH HYPERGLYCEMIA (HCC): Primary | ICD-10-CM

## 2021-09-08 DIAGNOSIS — Z79.4 TYPE 2 DIABETES MELLITUS WITH HYPERGLYCEMIA, WITH LONG-TERM CURRENT USE OF INSULIN (HCC): ICD-10-CM

## 2021-09-08 DIAGNOSIS — E11.65 TYPE 2 DIABETES MELLITUS WITH HYPERGLYCEMIA, WITH LONG-TERM CURRENT USE OF INSULIN (HCC): ICD-10-CM

## 2021-09-08 LAB
GLUCOSE POC: 164 MG/DL
HBA1C MFR BLD HPLC: 6.9 %

## 2021-09-08 PROCEDURE — 2022F DILAT RTA XM EVC RTNOPTHY: CPT | Performed by: INTERNAL MEDICINE

## 2021-09-08 PROCEDURE — G8536 NO DOC ELDER MAL SCRN: HCPCS | Performed by: INTERNAL MEDICINE

## 2021-09-08 PROCEDURE — G8427 DOCREV CUR MEDS BY ELIG CLIN: HCPCS | Performed by: INTERNAL MEDICINE

## 2021-09-08 PROCEDURE — G9899 SCRN MAM PERF RSLTS DOC: HCPCS | Performed by: INTERNAL MEDICINE

## 2021-09-08 PROCEDURE — 83036 HEMOGLOBIN GLYCOSYLATED A1C: CPT | Performed by: INTERNAL MEDICINE

## 2021-09-08 PROCEDURE — 82962 GLUCOSE BLOOD TEST: CPT | Performed by: INTERNAL MEDICINE

## 2021-09-08 PROCEDURE — G8510 SCR DEP NEG, NO PLAN REQD: HCPCS | Performed by: INTERNAL MEDICINE

## 2021-09-08 PROCEDURE — 1090F PRES/ABSN URINE INCON ASSESS: CPT | Performed by: INTERNAL MEDICINE

## 2021-09-08 PROCEDURE — G8419 CALC BMI OUT NRM PARAM NOF/U: HCPCS | Performed by: INTERNAL MEDICINE

## 2021-09-08 PROCEDURE — 3044F HG A1C LEVEL LT 7.0%: CPT | Performed by: INTERNAL MEDICINE

## 2021-09-08 PROCEDURE — G8400 PT W/DXA NO RESULTS DOC: HCPCS | Performed by: INTERNAL MEDICINE

## 2021-09-08 PROCEDURE — 3017F COLORECTAL CA SCREEN DOC REV: CPT | Performed by: INTERNAL MEDICINE

## 2021-09-08 PROCEDURE — 1101F PT FALLS ASSESS-DOCD LE1/YR: CPT | Performed by: INTERNAL MEDICINE

## 2021-09-08 PROCEDURE — 99214 OFFICE O/P EST MOD 30 MIN: CPT | Performed by: INTERNAL MEDICINE

## 2021-09-08 RX ORDER — DULAGLUTIDE 1.5 MG/.5ML
1.5 INJECTION, SOLUTION SUBCUTANEOUS
Qty: 4 EACH | Refills: 3 | Status: SHIPPED | OUTPATIENT
Start: 2021-09-08 | End: 2021-10-08

## 2021-09-08 RX ORDER — INSULIN GLARGINE 300 U/ML
INJECTION, SOLUTION SUBCUTANEOUS
Qty: 6 ML | Refills: 3 | Status: SHIPPED | OUTPATIENT
Start: 2021-09-08 | End: 2021-12-08 | Stop reason: ALTCHOICE

## 2021-09-08 RX ORDER — PEN NEEDLE, DIABETIC 31 GX3/16"
NEEDLE, DISPOSABLE MISCELLANEOUS
Qty: 100 PEN NEEDLE | Refills: 2 | Status: SHIPPED | OUTPATIENT
Start: 2021-09-08 | End: 2021-12-08 | Stop reason: SDUPTHER

## 2021-09-08 NOTE — PROGRESS NOTES
History and Physical    Patient: Preeti Christensen MRN: [id number]  SSN: [id number]    YOB: 1945  Age: 78 y.o. Sex: female      Subjective:      Preeti Christensen is a 78 y.o. female with past medical history of hypertension, hyperlipidemia, inflammatory arthritis is here for follow-up of type 2 diabetes mellitus. She was sent to me by primary care physician Dr. Evelio Martinez.    At the last visit I continued Trulicity 1.5 mg weekly, Toujeo was decreased from 40 units to 36 units at bedtime. She is trying to cut back on starches, eating more vegetables, not drinking any sugary beverages. She continues to walk on treadmill for exercise. She is not taking glipizide anymore which she was previously taking as needed. She checks blood glucose usually once a day. Denies any symptoms of hypoglycemia. She recently had kidney ultrasound done and she has appointment for follow-up with nephrologist at the end of this month. Up-to-date with diabetic eye exam.  She has inflammatory arthritis for which he sees Dr. Jesse William. She was given injection in her wrist and shoulder in feb 2021. She does not get steroids frequently. Glucometer reading: Checking blood glucose twice a day.   Readings ranging from 85 to 220 mg/dL    Updated diabetes history:  · Diagnosis: long time  · Current treatment: Trulicity 1.5 mg weekly, Toujeo 40 units at night (started 3-2021)  · Past treatment: glimepiride, metformin, glipizide  · Glucose checks: 3 times a day, fasting 80-90s, lunch: 200s, dinner: 300s,  · Hyperglycemia: yes  · Hypoglycemia: no  · Meals per day: 3, breakfast: sandwich, lunch: salad and ham/chicken, dinner: chicken, potatoes, snacks: crackers,   · Exercise: yes treadmill   · DM related hospitalizations: no    Complications of DM:  · CAD: no  · CVA: no  · PVD: no  · Amputations: no   · Retinopathy: no; last exam was 8-2020  · Gastropathy: no  · Nephropathy: ckd stage 3  · Neuropathy: no    Medications:  · Statin: atorvastatin 20, fenofibrate 160 mg  · ACE-I: no  · ASA: yes    · Diabetes education: no    Past Medical History:   Diagnosis Date    Arthritis     Diabetes ([address]     Gout     High cholesterol      Past Surgical History:   Procedure Laterality Date    HX BREAST BIOPSY Right 10 yrs ago    benign    HX GI      HX HYSTERECTOMY        Family History   Problem Relation Age of Onset   [facility] Cancer Mother     Breast Cancer Sister     Ovarian Cancer Sister     Diabetes Father      Social History     Tobacco Use    Smoking status: Never Smoker    Smokeless tobacco: Never Used   Substance Use Topics    Alcohol use: Not Currently      Prior to Admission medications    Medication Sig Start Date End Date Taking? Authorizing Provider   Insulin Needles, Disposable, (BD Trupti 2nd Gen Pen Needle) 32 gauge x 5/32\" ndle once a day, 90 days 9/8/21  Yes Maureen Gates MD   insulin glargine U-300 conc (Toujeo Max U-300 SoloStar) 300 unit/mL (3 mL) inpn Inject 36 units at bedtime 9/8/21  Yes Maureen Gates MD   dulaglutide (Trulicity) 1.5 KU/4.0 mL sub-q pen 0.5 mL by SubCUTAneous route every seven (7) days for 30 days. 9/8/21 10/8/21 Yes Maureen Gates MD   ibuprofen (MOTRIN) 600 mg tablet TAKE 1 TABLET BY MOUTH EVERY 6 HOURS AS NEEDED FOR PAIN. DO NOT COMBINE WITH ANY OTHER PAIN KILLERS 6/28/21  Yes Provider, Historical   cholecalciferol (Vitamin D3) (1000 Units /25 mcg) tablet Take  by mouth daily. Yes Provider, Historical   Vitamin B-6 50 mg cap TAKE 1 TABLET BY MOUTH DAILY 5/14/21  Yes Provider, Historical   Accu-Chek Guide test strips strip  5/20/21  Yes Provider, Historical   fenofibrate (LOFIBRA) 160 mg tablet TAKE 1 TABLET BY MOUTH AT BEDTIME 4/12/21  Yes Provider, Historical   isoniazid (NYDRAZID) 300 mg tablet Take 1 Tab by mouth daily for 270 days. Indications: treatment to prevent active tuberculosis infection 5/10/21 2/4/22 Yes Lima Ray MD   allopurinoL (ZYLOPRIM) 100 mg tablet Take  by mouth daily.    Yes Provider, Historical   amLODIPine (NORVASC) 5 mg tablet Take 5 mg by mouth daily. Yes Provider, Historical   atorvastatin (LIPITOR) 20 mg tablet Take  by mouth daily. Yes Provider, Historical   diclofenac EC (VOLTAREN) 50 mg EC tablet Take  by mouth two (2) times a day. Yes Provider, Historical   doxycycline (VIBRAMYCIN) 100 mg capsule Take 100 mg by mouth two (2) times a day. Yes Provider, Historical   ergocalciferol (Vitamin D2) 1,250 mcg (50,000 unit) capsule Take 50,000 Units by mouth. Yes Provider, Historical   estradioL (ESTRACE) 1 mg tablet Take 1 mg by mouth daily. Yes Provider, Historical   folic acid (FOLVITE) 1 mg tablet Take  by mouth daily. Yes Provider, Historical   irbesartan-hydroCHLOROthiazide (AVALIDE) 150-12.5 mg per tablet Take  by mouth. Yes Provider, Historical   omeprazole (PRILOSEC) 40 mg capsule Take 40 mg by mouth daily. Yes Provider, Historical        No Known Allergies    Review of Systems:  ROS    A comprehensive review of systems was preformed and it is negative except mentioned in HPI    Objective:     Vitals:    09/08/21 1036 09/08/21 1044   BP: (!) 142/77 139/76   Pulse: 67 (!) 59   Temp: 96.8 °F (36 °C)    TempSrc: Temporal    SpO2: 97%    Weight: 149 lb 14.4 oz (68 kg)    Height: 5' 2\" (1.575 m)         Physical Exam:    Physical Exam  Vitals and nursing note reviewed. Constitutional:       Appearance: Normal appearance. HENT:      Head: Normocephalic and atraumatic. Cardiovascular:      Rate and Rhythm: Normal rate and regular rhythm. Pulmonary:      Effort: Pulmonary effort is normal.      Breath sounds: Normal breath sounds. Neurological:      Mental Status: She is alert. 5-:  diabetic foot exam:  Bilateral diabetic foot exam was performed today. Dorsalis pedis pulses 2+ bilaterally. Monofilament sensation normal bilaterally. No ulcers or skin breakdown.     Labs and Imaging:    Last 3 Recorded Weights in this Encounter    09/08/21 1036 Weight: 149 lb 14.4 oz (68 kg)        No results found for: HBA1C, [id number], [id number], [id number]     Assessment:     Patient Active Problem List   Diagnosis Code    Tick bite W57Carmela Sanabria H/O total hysterectomy Z90.710    Menopausal syndrome N95.1    Uncontrolled type 2 diabetes mellitus with hyperglycemia (HCC) E11.65    CKD stage 3 due to type 2 diabetes mellitus (HCC) E11.22, N18.30    Hypertensive renal disease I12.9    Mixed hyperlipidemia E78.2    Inflammatory polyarthritis ([facility] [address] M06.4           Plan:     Type 2 diabetes mellitus, uncontrolled:  Hemoglobin A1c was 7% on 3-9-2021, 8.6% on 5-, 6.9% today. Fingerstick blood glucose is 164 mg/dL in my office today. Up to date with diabetes related annual labs: March 2021  Up to date with diabetic eye exam: August 2020  GFR was 41 in March 2021  Plan:  Given patient's age and comorbidities, avoiding hypoglycemia is important. Encourage patient to continue with diet and exercise. Glucose control has improved and it looks adequate today. Continue Trulicity 1.5 mg weekly, Toujeo 36 units at bedtime. Continue to check blood glucose 1-2 times per day and I will see her back in 3 months. CKD stage III:  GFR 41 in March 2021. Review of labs shows that since past 2 to 3 years kidney function has been declining. Patient has established care with nephrology. She had ultrasound recently and she has follow-up at the end of this month. Mixed hyperlipidemia:  3-9-2021: Total cholesterol 183, triglycerides 98, . Currently on atorvastatin 20 mg and fenofibrate 160 mg, has been on these medications since a long time. I will continue these for now, given patient's age.     Inflammatory polyarthritis:  Seeing rheumatologist.    Orders Placed This Encounter    AMB POC GLUCOSE BLOOD, BY GLUCOSE MONITORING DEVICE    AMB POC HEMOGLOBIN A1C    Insulin Needles, Disposable, (BD Trupti 2nd Gen Pen Needle) 32 gauge x 5/32\" ndle     Sig: once a day, 90 days     Dispense:  100 Pen Needle     Refill:  2    insulin glargine U-300 conc (Toujeo Max U-300 SoloStar) 300 unit/mL (3 mL) inpn     Sig: Inject 36 units at bedtime     Dispense:  6 mL     Refill:  3    dulaglutide (Trulicity) 1.5 HN/1.9 mL sub-q pen     Si.5 mL by SubCUTAneous route every seven (7) days for 30 days.      Dispense:  4 Each     Refill:  3        Signed By: Gallito Briones MD     2021      Return to clinic 3 months

## 2021-09-08 NOTE — LETTER
9/8/2021    Patient: Sydney Moreno   YOB: 1945   Date of Visit: 9/8/2021     Anny Ramos MD  [address] [place], [place] [id number]  Via Fax: [phone]    Dear Anny Ramso MD,      Thank you for referring Ms. Sydney Moreno to [address] [place] [address] for evaluation. My notes for this consultation are attached. If you have questions, please do not hesitate to call me. I look forward to following your patient along with you.       Sincerely,    Vishnu Epps MD

## 2021-11-08 ENCOUNTER — TELEPHONE (OUTPATIENT)
Dept: ENDOCRINOLOGY | Age: 76
End: 2021-11-08

## 2021-11-08 DIAGNOSIS — E11.65 TYPE 2 DIABETES MELLITUS WITH HYPERGLYCEMIA, WITH LONG-TERM CURRENT USE OF INSULIN (HCC): Primary | ICD-10-CM

## 2021-11-08 DIAGNOSIS — Z79.4 TYPE 2 DIABETES MELLITUS WITH HYPERGLYCEMIA, WITH LONG-TERM CURRENT USE OF INSULIN (HCC): Primary | ICD-10-CM

## 2021-11-08 RX ORDER — INSULIN GLARGINE 100 [IU]/ML
INJECTION, SOLUTION SUBCUTANEOUS
Qty: 4 ADJUSTABLE DOSE PRE-FILLED PEN SYRINGE | Refills: 3 | Status: SHIPPED | OUTPATIENT
Start: 2021-11-08 | End: 2021-12-08 | Stop reason: SDUPTHER

## 2021-11-08 NOTE — TELEPHONE ENCOUNTER
Switched to Lantus. Sent prescription to [facility]. Please inform patient.

## 2021-11-08 NOTE — TELEPHONE ENCOUNTER
Patient called in stated that her Cherl Scot is to expensive to get now. Her PCP was giving her the samples that he had in his office now he doesn't have any more.  She is asking if there is something else to take in the place of the Cherl Scot

## 2021-12-08 ENCOUNTER — OFFICE VISIT (OUTPATIENT)
Dept: ENDOCRINOLOGY | Age: 76
End: 2021-12-08
Payer: MEDICARE

## 2021-12-08 VITALS
TEMPERATURE: 98 F | HEART RATE: 63 BPM | SYSTOLIC BLOOD PRESSURE: 145 MMHG | HEIGHT: 62 IN | OXYGEN SATURATION: 99 % | DIASTOLIC BLOOD PRESSURE: 76 MMHG | WEIGHT: 153.4 LBS | BODY MASS INDEX: 28.23 KG/M2

## 2021-12-08 DIAGNOSIS — Z79.4 TYPE 2 DIABETES MELLITUS WITH HYPERGLYCEMIA, WITH LONG-TERM CURRENT USE OF INSULIN (HCC): Primary | ICD-10-CM

## 2021-12-08 DIAGNOSIS — E11.65 TYPE 2 DIABETES MELLITUS WITH HYPERGLYCEMIA, WITH LONG-TERM CURRENT USE OF INSULIN (HCC): Primary | ICD-10-CM

## 2021-12-08 LAB
GLUCOSE POC: 199 MG/DL
HBA1C MFR BLD HPLC: 8.7 %

## 2021-12-08 PROCEDURE — G8419 CALC BMI OUT NRM PARAM NOF/U: HCPCS | Performed by: INTERNAL MEDICINE

## 2021-12-08 PROCEDURE — 82962 GLUCOSE BLOOD TEST: CPT | Performed by: INTERNAL MEDICINE

## 2021-12-08 PROCEDURE — 1101F PT FALLS ASSESS-DOCD LE1/YR: CPT | Performed by: INTERNAL MEDICINE

## 2021-12-08 PROCEDURE — 83036 HEMOGLOBIN GLYCOSYLATED A1C: CPT | Performed by: INTERNAL MEDICINE

## 2021-12-08 PROCEDURE — 3052F HG A1C>EQUAL 8.0%<EQUAL 9.0%: CPT | Performed by: INTERNAL MEDICINE

## 2021-12-08 PROCEDURE — 2022F DILAT RTA XM EVC RTNOPTHY: CPT | Performed by: INTERNAL MEDICINE

## 2021-12-08 PROCEDURE — 3017F COLORECTAL CA SCREEN DOC REV: CPT | Performed by: INTERNAL MEDICINE

## 2021-12-08 PROCEDURE — G9899 SCRN MAM PERF RSLTS DOC: HCPCS | Performed by: INTERNAL MEDICINE

## 2021-12-08 PROCEDURE — G8536 NO DOC ELDER MAL SCRN: HCPCS | Performed by: INTERNAL MEDICINE

## 2021-12-08 PROCEDURE — 1090F PRES/ABSN URINE INCON ASSESS: CPT | Performed by: INTERNAL MEDICINE

## 2021-12-08 PROCEDURE — G8400 PT W/DXA NO RESULTS DOC: HCPCS | Performed by: INTERNAL MEDICINE

## 2021-12-08 PROCEDURE — G8427 DOCREV CUR MEDS BY ELIG CLIN: HCPCS | Performed by: INTERNAL MEDICINE

## 2021-12-08 PROCEDURE — 99214 OFFICE O/P EST MOD 30 MIN: CPT | Performed by: INTERNAL MEDICINE

## 2021-12-08 PROCEDURE — G8510 SCR DEP NEG, NO PLAN REQD: HCPCS | Performed by: INTERNAL MEDICINE

## 2021-12-08 RX ORDER — PIOGLITAZONEHYDROCHLORIDE 30 MG/1
30 TABLET ORAL DAILY
Qty: 30 TABLET | Refills: 3 | Status: SHIPPED | OUTPATIENT
Start: 2021-12-08 | End: 2022-01-07

## 2021-12-08 RX ORDER — INSULIN GLARGINE 100 [IU]/ML
INJECTION, SOLUTION SUBCUTANEOUS
Qty: 4 ADJUSTABLE DOSE PRE-FILLED PEN SYRINGE | Refills: 3 | Status: SHIPPED | OUTPATIENT
Start: 2021-12-08 | End: 2022-03-08 | Stop reason: ALTCHOICE

## 2021-12-08 RX ORDER — PEN NEEDLE, DIABETIC 31 GX3/16"
NEEDLE, DISPOSABLE MISCELLANEOUS
Qty: 100 PEN NEEDLE | Refills: 2 | Status: SHIPPED | OUTPATIENT
Start: 2021-12-08

## 2021-12-08 NOTE — PROGRESS NOTES
History and Physical    Patient: Tushar Salazar MRN: [id number]  SSN: [id number]    YOB: 1945  Age: 78 y.o. Sex: female      Subjective:      Tushar Salazar is a 78 y.o. female with past medical history of hypertension, hyperlipidemia, inflammatory arthritis is here for follow-up of type 2 diabetes mellitus. She was sent to me by primary care physician Dr. James Vasquez.    At the last visit I continued Trulicity 1.5 mg weekly, Toujeo 36 units at night. Toujeo was switched to Lantus as it was less expensive. Patient continues to have trouble with affording her medications. She was getting samples from her PCP but he does not have samples anymore. She was not compliant with diabetic diet last month but now she is coming back on track. She has gained 4 pounds since last visit. Denies any hypoglycemia. Does not drink any sugary beverages. Last appointment with nephrologist was in September 2021 and her kidney function was stable at that time. Up-to-date with diabetic eye exam.  She has inflammatory arthritis for which he sees Dr. Marina Daniels. She was given injection in her wrist and shoulder in feb 2021. She does not get steroids frequently. Glucometer reading: Checking blood glucose usually once a day.   Readings are ranging from 89 to 307 mg/dL    Updated diabetes history:  · Diagnosis: long time  · Current treatment: Trulicity 1.5 mg weekly, Lantus 36 units at night (started 3-2021)  · Past treatment: glimepiride, metformin, glipizide, Toujeo (expensive)  · Glucose checks: 3 times a day, fasting 80-90s, lunch, : 200s, dinner: 300s,  · Hyperglycemia: yes  · Hypoglycemia: no  · Meals per day: 3, breakfast: sandwich, lunch: salad and ham/chicken, dinner: chicken, potatoes, snacks: crackers,   · Exercise: yes treadmill   · DM related hospitalizations: no    Complications of DM:  · CAD: no  · CVA: no  · PVD: no  · Amputations: no   · Retinopathy: no; last exam was 8-2020  · Gastropathy: no  · Nephropathy: ckd stage 3  · Neuropathy: no    Medications:  · Statin: atorvastatin 20, fenofibrate 160 mg  · ACE-I: no  · ASA: yes    · Diabetes education: no    Past Medical History:   Diagnosis Date    Arthritis     Diabetes ([address]     Gout     High cholesterol      Past Surgical History:   Procedure Laterality Date    HX BREAST BIOPSY Right 10 yrs ago    benign    HX GI      HX HYSTERECTOMY        Family History   Problem Relation Age of Onset    Cancer Mother     Breast Cancer Sister     Ovarian Cancer Sister     Diabetes Father      Social History     Tobacco Use    Smoking status: Never Smoker    Smokeless tobacco: Never Used   Substance Use Topics    Alcohol use: Not Currently      Prior to Admission medications    Medication Sig Start Date End Date Taking? Authorizing Provider   insulin glargine (LANTUS,BASAGLAR) 100 unit/mL (3 mL) inpn Inject 36 units at bedtime 12/8/21  Yes Emerald Greene MD   Insulin Needles, Disposable, (BD Trupti 2nd Gen Pen Needle) 32 gauge x 5/32\" ndle once a day, 90 days 12/8/21  Yes Emerald Greene MD   pioglitazone (ACTOS) 30 mg tablet Take 1 Tablet by mouth daily for 30 days. 12/8/21 1/7/22 Yes Emerald Greene MD   ibuprofen (MOTRIN) 600 mg tablet TAKE 1 TABLET BY MOUTH EVERY 6 HOURS AS NEEDED FOR PAIN. DO NOT COMBINE WITH ANY OTHER PAIN KILLERS 6/28/21  Yes Provider, Historical   cholecalciferol (Vitamin D3) (1000 Units /25 mcg) tablet Take  by mouth daily. Yes Provider, Historical   Vitamin B-6 50 mg cap TAKE 1 TABLET BY MOUTH DAILY 5/14/21  Yes Provider, Historical   Accu-Chek Guide test strips strip  5/20/21  Yes Provider, Historical   fenofibrate (LOFIBRA) 160 mg tablet TAKE 1 TABLET BY MOUTH AT BEDTIME 4/12/21  Yes Provider, Historical   isoniazid (NYDRAZID) 300 mg tablet Take 1 Tab by mouth daily for 270 days.  Indications: treatment to prevent active tuberculosis infection 5/10/21 2/4/22 Yes Andrew Alonso MD   allopurinoL (ZYLOPRIM) 100 mg tablet Take  by mouth daily. Yes Provider, Historical   amLODIPine (NORVASC) 5 mg tablet Take 5 mg by mouth daily. Yes Provider, Historical   atorvastatin (LIPITOR) 20 mg tablet Take  by mouth daily. Yes Provider, Historical   diclofenac EC (VOLTAREN) 50 mg EC tablet Take  by mouth two (2) times a day. Yes Provider, Historical   doxycycline (VIBRAMYCIN) 100 mg capsule Take 100 mg by mouth two (2) times a day. Yes Provider, Historical   ergocalciferol (Vitamin D2) 1,250 mcg (50,000 unit) capsule Take 50,000 Units by mouth. Yes Provider, Historical   estradioL (ESTRACE) 1 mg tablet Take 1 mg by mouth daily. Yes Provider, Historical   folic acid (FOLVITE) 1 mg tablet Take  by mouth daily. Yes Provider, Historical   irbesartan-hydroCHLOROthiazide (AVALIDE) 150-12.5 mg per tablet Take  by mouth. Yes Provider, Historical   omeprazole (PRILOSEC) 40 mg capsule Take 40 mg by mouth daily. Yes Provider, Historical        No Known Allergies    Review of Systems:  ROS    A comprehensive review of systems was preformed and it is negative except mentioned in HPI    Objective:     Vitals:    12/08/21 1006 12/08/21 1012   BP: (!) 149/71 (!) 145/76   Pulse: (!) 58 63   Temp: 98 °F (36.7 °C)    TempSrc: Temporal    SpO2: 99%    Weight: 153 lb 6.4 oz (69.6 kg)    Height: 5' 2\" (1.575 m)         Physical Exam:    Physical Exam  Vitals and nursing note reviewed. Constitutional:       Appearance: Normal appearance. HENT:      Head: Normocephalic and atraumatic. Cardiovascular:      Rate and Rhythm: Normal rate and regular rhythm. Pulmonary:      Effort: Pulmonary effort is normal.      Breath sounds: Normal breath sounds. Neurological:      Mental Status: She is alert. 5-:  diabetic foot exam:  Bilateral diabetic foot exam was performed today. Dorsalis pedis pulses 2+ bilaterally. Monofilament sensation normal bilaterally. No ulcers or skin breakdown.     Labs and Imaging:    Last 3 Recorded Weights in this Encounter 12/08/21 1006   Weight: 153 lb 6.4 oz (69.6 kg)        No results found for: HBA1C, HYJ1YVMZ, SGO9LJPR, FDZ2VPKF     Assessment:     Patient Active Problem List   Diagnosis Code    Tick bite W57. Esa Meyers H/O total hysterectomy Z90.710    Menopausal syndrome N95.1    Uncontrolled type 2 diabetes mellitus with hyperglycemia (HCC) E11.65    CKD stage 3 due to type 2 diabetes mellitus (HCC) E11.22, N18.30    Hypertensive renal disease I12.9    Mixed hyperlipidemia E78.2    Inflammatory polyarthritis ([facility] M06.4           Plan:     Type 2 diabetes mellitus, uncontrolled:  Hemoglobin A1c was 7% on 3-9-2021, 8.6% on 5-, 6.9% on 9-8-2021, 8.7% today. Fingerstick blood glucose is 199 mg/dL in my office today. Up to date with diabetes related annual labs: November 2021  Up to date with diabetic eye exam: August 2020  GFR was 41 in March 2021, 37 in November 2021  Plan:  Given patient's age and comorbidities, avoiding hypoglycemia is important. Discussed with patient about following diabetic diet. It looks like glucose control worsened because she was not compliant with diabetic diet a few weeks back. Since she is having trouble with cost of Trulicity I am going to switch her to pioglitazone 30 mg in the morning. Continue Lantus 36 units at bedtime. Advised patient to check blood glucose twice a day every day and I will see her back in 3 months. CKD stage III:  GFR 41 in March 2021, 37 in November 2021. Last appointment with nephrologist was September 2021. Hypertensive renal disease:  Blood pressure is generally well controlled. Blood pressure is elevated today, patient is attributing it to stress. Discussed with patient importance of following low-salt diet, taking blood pressure medications regularly. Mixed hyperlipidemia:  3-9-2021: Total cholesterol 183, triglycerides 98, .  11-: Total cholesterol 159, triglycerides 82, LDL 90.   Currently on atorvastatin 20 mg and fenofibrate 160 mg, has been on these medications since a long time. I will continue these for now, given patient's age. Inflammatory polyarthritis:  Seeing rheumatologist.    Orders Placed This Encounter    AMB POC GLUCOSE BLOOD, BY GLUCOSE MONITORING DEVICE    AMB POC HEMOGLOBIN A1C    insulin glargine (LANTUS,BASAGLAR) 100 unit/mL (3 mL) inpn     Sig: Inject 36 units at bedtime     Dispense:  4 Adjustable Dose Pre-filled Pen Syringe     Refill:  3    Insulin Needles, Disposable, (BD Trupti 2nd Gen Pen Needle) 32 gauge x 5/32\" ndle     Sig: once a day, 90 days     Dispense:  100 Pen Needle     Refill:  2    pioglitazone (ACTOS) 30 mg tablet     Sig: Take 1 Tablet by mouth daily for 30 days.      Dispense:  30 Tablet     Refill:  3     DC Trulicity        Signed By: Verónica Cody MD     December 8, 2021      Return to clinic 3 months

## 2021-12-08 NOTE — LETTER
12/8/2021    Patient: Sydney Fulton   YOB: 1945   Date of Visit: 12/8/2021     Nakia Parks MD  [address] [place] [id number]  Via Fax: [phone]    Dear Nakia Parks MD,      Thank you for referring Ms. Sydney Fulton to [address] [place] [address] for evaluation. My notes for this consultation are attached. If you have questions, please do not hesitate to call me. I look forward to following your patient along with you.       Sincerely,    Glenroy Santos MD

## 2022-03-08 ENCOUNTER — OFFICE VISIT (OUTPATIENT)
Dept: ENDOCRINOLOGY | Age: 77
End: 2022-03-08
Payer: MEDICARE

## 2022-03-08 ENCOUNTER — TELEPHONE (OUTPATIENT)
Dept: INFECTIOUS DISEASES | Age: 77
End: 2022-03-08

## 2022-03-08 VITALS
BODY MASS INDEX: 30.79 KG/M2 | DIASTOLIC BLOOD PRESSURE: 56 MMHG | WEIGHT: 167.3 LBS | RESPIRATION RATE: 18 BRPM | HEIGHT: 62 IN | TEMPERATURE: 97.6 F | SYSTOLIC BLOOD PRESSURE: 109 MMHG | HEART RATE: 69 BPM | OXYGEN SATURATION: 98 %

## 2022-03-08 DIAGNOSIS — E11.65 TYPE 2 DIABETES MELLITUS WITH HYPERGLYCEMIA, WITH LONG-TERM CURRENT USE OF INSULIN (HCC): Primary | ICD-10-CM

## 2022-03-08 DIAGNOSIS — E78.2 MIXED HYPERLIPIDEMIA: ICD-10-CM

## 2022-03-08 DIAGNOSIS — N18.30 CKD STAGE 3 DUE TO TYPE 2 DIABETES MELLITUS (HCC): ICD-10-CM

## 2022-03-08 DIAGNOSIS — Z79.4 TYPE 2 DIABETES MELLITUS WITH HYPERGLYCEMIA, WITH LONG-TERM CURRENT USE OF INSULIN (HCC): Primary | ICD-10-CM

## 2022-03-08 DIAGNOSIS — E11.22 CKD STAGE 3 DUE TO TYPE 2 DIABETES MELLITUS (HCC): ICD-10-CM

## 2022-03-08 DIAGNOSIS — I12.9 HYPERTENSIVE RENAL DISEASE: ICD-10-CM

## 2022-03-08 PROCEDURE — G8536 NO DOC ELDER MAL SCRN: HCPCS | Performed by: INTERNAL MEDICINE

## 2022-03-08 PROCEDURE — G8400 PT W/DXA NO RESULTS DOC: HCPCS | Performed by: INTERNAL MEDICINE

## 2022-03-08 PROCEDURE — 1101F PT FALLS ASSESS-DOCD LE1/YR: CPT | Performed by: INTERNAL MEDICINE

## 2022-03-08 PROCEDURE — G8427 DOCREV CUR MEDS BY ELIG CLIN: HCPCS | Performed by: INTERNAL MEDICINE

## 2022-03-08 PROCEDURE — 3046F HEMOGLOBIN A1C LEVEL >9.0%: CPT | Performed by: INTERNAL MEDICINE

## 2022-03-08 PROCEDURE — 99214 OFFICE O/P EST MOD 30 MIN: CPT | Performed by: INTERNAL MEDICINE

## 2022-03-08 PROCEDURE — 3017F COLORECTAL CA SCREEN DOC REV: CPT | Performed by: INTERNAL MEDICINE

## 2022-03-08 PROCEDURE — 2022F DILAT RTA XM EVC RTNOPTHY: CPT | Performed by: INTERNAL MEDICINE

## 2022-03-08 PROCEDURE — G9899 SCRN MAM PERF RSLTS DOC: HCPCS | Performed by: INTERNAL MEDICINE

## 2022-03-08 PROCEDURE — G8510 SCR DEP NEG, NO PLAN REQD: HCPCS | Performed by: INTERNAL MEDICINE

## 2022-03-08 PROCEDURE — G8417 CALC BMI ABV UP PARAM F/U: HCPCS | Performed by: INTERNAL MEDICINE

## 2022-03-08 PROCEDURE — 1090F PRES/ABSN URINE INCON ASSESS: CPT | Performed by: INTERNAL MEDICINE

## 2022-03-08 RX ORDER — INSULIN GLARGINE AND LIXISENATIDE 100; 33 U/ML; UG/ML
INJECTION, SOLUTION SUBCUTANEOUS
Qty: 9 ML | Refills: 3 | Status: SHIPPED | OUTPATIENT
Start: 2022-03-08 | End: 2022-04-04 | Stop reason: SDUPTHER

## 2022-03-08 RX ORDER — ATORVASTATIN CALCIUM 20 MG/1
20 TABLET, FILM COATED ORAL
Qty: 90 TABLET | Refills: 2 | Status: SHIPPED | OUTPATIENT
Start: 2022-03-08 | End: 2022-06-06

## 2022-03-08 NOTE — LETTER
3/8/2022    Patient: Pastor Aparicio   YOB: 1945   Date of Visit: 3/8/2022     Loki Nelson MD  [address] [place] [address]  Via Fax: [phone]    Dear Loki Nelson MD,      Thank you for referring Ms. Pastor Aparicio to [address] [place] [address] for evaluation. My notes for this consultation are attached. If you have questions, please do not hesitate to call me. I look forward to following your patient along with you.       Sincerely,    Quinten Reid MD

## 2022-03-08 NOTE — PROGRESS NOTES
1. \"Have you been to the ER, urgent care clinic since your last visit? Hospitalized since your last visit? \" No    2. \"Have you seen or consulted any other health care providers outside of the [address] [place] [address] since your last visit? \" No     3. For patients aged 39-70: Has the patient had a colonoscopy / FIT/ Cologuard? Yes - no Care Gap present      If the patient is female:    4. For patients aged 41-77: Has the patient had a mammogram within the past 2 years? Yes - no Care Gap present      5. For patients aged 21-65: Has the patient had a pap smear?  No

## 2022-03-08 NOTE — PATIENT INSTRUCTIONS
Stop Pioglitazone    Start Soliqua 24 units every morning (in place of Lantus)    Take atorvastatin 20 mg every night for cholesterol    Stop Aspirin

## 2022-03-08 NOTE — TELEPHONE ENCOUNTER
Patient stated that she has finished her medicine and she wants to know if she needs to have a chest x-ray done.

## 2022-03-08 NOTE — TELEPHONE ENCOUNTER
Patient has completed 9 months of INH for latent TB. No CXR is needed. She is cleared to begin DMARDs per Rheumatology. Patient informed.

## 2022-03-08 NOTE — PROGRESS NOTES
History and Physical    Patient: Kimberlee Lira MRN: [id number]  SSN: [id number]    YOB: 1945  Age: 78 y.o. Sex: female      Subjective:      Kimberlee Lira is a 78 y.o. female with past medical history of hypertension, hyperlipidemia, inflammatory arthritis is here for follow-up of type 2 diabetes mellitus. She was sent to me by primary care physician Dr. Kristian Hernandez.    At the last visit 3 months back it was noted that her hemoglobin A1c had worsened because of dietary noncompliance. Also she was having trouble with cost of Trulicity, so we stopped it and patient was started on pioglitazone 30 mg daily, we continued Lantus 36 units at night but patient started to have fasting hypoglycemia so she cut back Lantus to 26 units at night and she is not having fasting hypoglycemia anymore but she had a couple readings in the 60s fasting in the morning. She has gained 14 pounds since last visit although she is working on her diet. She is worried about this weight gain. Next appointment with nephrologist is in 2 weeks. Continues to have trouble with cost of medications, her insurance covers Lantus but it cost $47, she cannot be more than that. Regarding hyperlipidemia: She was on fenofibrate 160 mg and atorvastatin 20 mg daily but for some reason PCP advised her to cut atorvastatin in half and take it only every other day. Patient does not think she was having trouble with this medication, not sure why the dose was decreased. She had labs done by PCP last week. Up-to-date with diabetic eye exam.  She has inflammatory arthritis for which he sees Dr. Tanisha Munoz. She was given injection in her wrist and shoulder in feb 2021. She does not get steroids frequently. Glucometer reading: Checking blood glucose usually once a day.   Readings are ranging from 85 to 121 mg/dL    Updated diabetes history:  · Diagnosis: long time  · Current treatment: pioglitazone 30 mg daily, Lantus 26 units at night (started 3-2021)  · Past treatment: glimepiride, metformin, glipizide, Toujeo (expensive)  · Glucose checks: 3 times a day, fasting 80-90s, lunch, : 200s, dinner: 300s,  · Hyperglycemia: yes  · Hypoglycemia: no  · Meals per day: 3, breakfast: sandwich, lunch: salad and ham/chicken, dinner: chicken, potatoes, snacks: crackers,   · Exercise: yes treadmill   · DM related hospitalizations: no    Complications of DM:  · CAD: no  · CVA: no  · PVD: no  · Amputations: no   · Retinopathy: no; last exam was 8-2020  · Gastropathy: no  · Nephropathy: ckd stage 3  · Neuropathy: no    Medications:  · Statin: atorvastatin 20, fenofibrate 160 mg  · ACE-I: no  · ASA: yes    · Diabetes education: no    Past Medical History:   Diagnosis Date    Arthritis     Diabetes ([facility]     Gout     High cholesterol      Past Surgical History:   Procedure Laterality Date    HX BREAST BIOPSY Right 10 yrs ago    benign    HX GI      HX HYSTERECTOMY        Family History   Problem Relation Age of Onset    Cancer Mother     Breast Cancer Sister     Ovarian Cancer Sister     Diabetes Father      Social History     Tobacco Use    Smoking status: Never Smoker    Smokeless tobacco: Never Used   Substance Use Topics    Alcohol use: Not Currently      Prior to Admission medications    Medication Sig Start Date End Date Taking? Authorizing Provider   insulin glargine-lixisenatide (Soliqua 100/33) 100 unit-33 mcg/mL inpn Inject 24 units in am 3/8/22  Yes Hope Frederick MD   atorvastatin (LIPITOR) 20 mg tablet Take 1 Tablet by mouth nightly for 90 days. 3/8/22 6/6/22 Yes Hope Frederick MD   Insulin Needles, Disposable, (BD Trupti 2nd Gen Pen Needle) 32 gauge x 5/32\" ndle once a day, 90 days 12/8/21  Yes Hope Frederick MD   ibuprofen (MOTRIN) 600 mg tablet TAKE 1 TABLET BY MOUTH EVERY 6 HOURS AS NEEDED FOR PAIN.  DO NOT COMBINE WITH ANY OTHER PAIN KILLERS 6/28/21  Yes Provider, Historical   cholecalciferol (Vitamin D3) (1000 Units /25 mcg) tablet Take  by mouth daily. Yes Provider, Historical   Accu-Chek Guide test strips strip  5/20/21  Yes Provider, Historical   fenofibrate (LOFIBRA) 160 mg tablet TAKE 1 TABLET BY MOUTH AT BEDTIME 4/12/21  Yes Provider, Historical   allopurinoL (ZYLOPRIM) 100 mg tablet Take  by mouth daily. Yes Provider, Historical   amLODIPine (NORVASC) 5 mg tablet Take 5 mg by mouth daily. Yes Provider, Historical   diclofenac EC (VOLTAREN) 50 mg EC tablet Take  by mouth two (2) times a day. Yes Provider, Historical   doxycycline (VIBRAMYCIN) 100 mg capsule Take 100 mg by mouth two (2) times a day. Yes Provider, Historical   ergocalciferol (Vitamin D2) 1,250 mcg (50,000 unit) capsule Take 50,000 Units by mouth. Yes Provider, Historical   folic acid (FOLVITE) 1 mg tablet Take  by mouth daily. Yes Provider, Historical   irbesartan-hydroCHLOROthiazide (AVALIDE) 150-12.5 mg per tablet Take  by mouth. Yes Provider, Historical   omeprazole (PRILOSEC) 40 mg capsule Take 40 mg by mouth daily. Yes Provider, Historical   Vitamin B-6 50 mg cap TAKE 1 TABLET BY MOUTH DAILY  Patient not taking: Reported on 3/8/2022 5/14/21   Provider, Historical   isoniazid (NYDRAZID) 300 mg tablet Take 1 Tab by mouth daily for 270 days. Indications: treatment to prevent active tuberculosis infection 5/10/21 2/4/22  Osbaldo Dowd MD   estradioL (ESTRACE) 1 mg tablet Take 1 mg by mouth daily. Patient not taking: Reported on 3/8/2022    Provider, Historical        No Known Allergies    Review of Systems:  ROS    A comprehensive review of systems was preformed and it is negative except mentioned in HPI    Objective:     Vitals:    03/08/22 0949   BP: (!) 109/56   Pulse: 69   Resp: 18   Temp: 97.6 °F (36.4 °C)   TempSrc: Oral   SpO2: 98%   Weight: 167 lb 4.8 oz (75.9 kg)   Height: 5' 2\" (1.575 m)        Physical Exam:    Physical Exam  Vitals and nursing note reviewed. Constitutional:       Appearance: Normal appearance.    HENT:      Head: Normocephalic and atraumatic. Cardiovascular:      Rate and Rhythm: Normal rate and regular rhythm. Pulmonary:      Effort: Pulmonary effort is normal.      Breath sounds: Normal breath sounds. Neurological:      Mental Status: She is alert. 5-:  diabetic foot exam:  Bilateral diabetic foot exam was performed today. Dorsalis pedis pulses 2+ bilaterally. Monofilament sensation normal bilaterally. No ulcers or skin breakdown. Labs and Imaging:    Last 3 Recorded Weights in this Encounter    03/08/22 0949   Weight: 167 lb 4.8 oz (75.9 kg)        No results found for: HBA1C, FTQ9FNHN, PJB1NHNB, IKK2NAFT     Assessment:     Patient Active Problem List   Diagnosis Code    Tick bite W57. Jerson  H/O total hysterectomy Z90.710    Menopausal syndrome N95.1    Uncontrolled type 2 diabetes mellitus with hyperglycemia ([facility]) E11.65    CKD stage 3 due to type 2 diabetes mellitus ([facility] [address] E11.22, N18.30    Hypertensive renal disease I12.9    Mixed hyperlipidemia E78.2    Inflammatory polyarthritis ([facility] M06.4           Plan:     Type 2 diabetes mellitus, uncontrolled:  Hemoglobin A1c was 7% on 3-9-2021, 8.6% on 5-, 6.9% on 9-8-2021, 8.7% on 12--8--2021, 6.5% on 3-1-2021  Fingerstick blood glucose is 217 mg/dL in my office today. Up to date with diabetes related annual labs: November 2021, March 2022  Up to date with diabetic eye exam: August 2020  GFR was 41 in March 2021, 37 in November 2021, 44 in March 2022  Plan:  Given patient's age and comorbidities, avoiding hypoglycemia is important. Advised patient to continue to work on diabetic diet. Because of weight gain I am going to stop pioglitazone. Switch from Lantus to [facility], decrease dose from 26 units to 24 units and take it every morning. Written instructions were given to patient. Check blood glucose twice a day every day and bring glucometer to next visit in 3 months. CKD stage III:  GFR 41 in March 2021, 37 in November 2021, 44 in March 2022. Last appointment with nephrologist was September 2021. Next appointment is in 2 weeks. Hypertensive renal disease:  Generally her blood pressure runs normal to slightly elevated, today blood pressure is low. Patient tells me that she had taken both her medications this morning, usually she does not take her blood pressure medications before coming for the appointment. Advised patient that if her blood pressure continues to be low then her medications need to be decreased to prevent worsening of renal function. Advised to discuss with nephrologist at upcoming appointment. Mixed hyperlipidemia:  3-9-2021: Total cholesterol 183, triglycerides 98, .  11-: Total cholesterol 159, triglycerides 82, LDL 90. Currently on atorvastatin 20 mg and fenofibrate 160 mg, has been on these medications since a long time. For some reason atorvastatin was decreased to half tablet every other day and patient is taking it in the morning. 3-1-2022: Total cholesterol 180, triglycerides 68, . Advised patient to increase atorvastatin back to 20 mg and take it every day at bedtime. Patient expressed understanding. I will recheck lipid profile fasting in a few months. Inflammatory polyarthritis:  Seeing rheumatologist.    Orders Placed This Encounter    AMB POC GLUCOSE BLOOD, BY GLUCOSE MONITORING DEVICE    AMB POC HEMOGLOBIN A1C    insulin glargine-lixisenatide (Soliqua 100/33) 100 unit-33 mcg/mL inpn     Sig: Inject 24 units in am     Dispense:  9 mL     Refill:  3     DC Lantus and Pioglitazone    atorvastatin (LIPITOR) 20 mg tablet     Sig: Take 1 Tablet by mouth nightly for 90 days.      Dispense:  90 Tablet     Refill:  2        Signed By: Kirstin Hutton MD     March 8, 2022      Return to clinic 3 months

## 2022-03-18 PROBLEM — E78.2 MIXED HYPERLIPIDEMIA: Status: ACTIVE | Noted: 2021-05-24

## 2022-03-18 PROBLEM — E11.65 UNCONTROLLED TYPE 2 DIABETES MELLITUS WITH HYPERGLYCEMIA (HCC): Status: ACTIVE | Noted: 2021-05-24

## 2022-03-18 PROBLEM — N95.1 MENOPAUSAL SYNDROME: Status: ACTIVE | Noted: 2020-07-15
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
## 2022-03-19 PROBLEM — E11.22 CKD STAGE 3 DUE TO TYPE 2 DIABETES MELLITUS (HCC): Status: ACTIVE | Noted: 2021-05-24

## 2022-03-19 PROBLEM — N18.30 CKD STAGE 3 DUE TO TYPE 2 DIABETES MELLITUS (HCC): Status: ACTIVE | Noted: 2021-05-24

## 2022-03-19 PROBLEM — Z90.710 H/O TOTAL HYSTERECTOMY: Status: ACTIVE | Noted: 2020-07-15

## 2022-03-20 PROBLEM — I12.9 HYPERTENSIVE RENAL DISEASE: Status: ACTIVE | Noted: 2021-05-24

## 2022-03-20 PROBLEM — M06.4 INFLAMMATORY POLYARTHRITIS (HCC): Status: ACTIVE | Noted: 2021-05-24

## 2022-04-04 ENCOUNTER — TELEPHONE (OUTPATIENT)
Dept: ENDOCRINOLOGY | Age: 77
End: 2022-04-04

## 2022-04-04 DIAGNOSIS — Z79.4 TYPE 2 DIABETES MELLITUS WITH HYPERGLYCEMIA, WITH LONG-TERM CURRENT USE OF INSULIN (HCC): ICD-10-CM

## 2022-04-04 DIAGNOSIS — E11.65 TYPE 2 DIABETES MELLITUS WITH HYPERGLYCEMIA, WITH LONG-TERM CURRENT USE OF INSULIN (HCC): ICD-10-CM

## 2022-04-04 RX ORDER — INSULIN GLARGINE AND LIXISENATIDE 100; 33 U/ML; UG/ML
INJECTION, SOLUTION SUBCUTANEOUS
Qty: 9 ML | Refills: 3 | Status: SHIPPED | OUTPATIENT
Start: 2022-04-04

## 2022-04-04 NOTE — TELEPHONE ENCOUNTER
Spoke to patient. Soliqua cost $70, Lantus cost around $47.  It is expensive, but it is doable. However she would like to try to switch to [facility] to see if it will be cheaper. Sending prescription to [facility].

## 2022-04-04 NOTE — TELEPHONE ENCOUNTER
Patient came by the office to inform you that the medication Soliqua cost more than the other medication you had her on.  She wants to know what she can do now for her medication

## 2022-05-09 ENCOUNTER — OFFICE VISIT (OUTPATIENT)
Dept: OBGYN CLINIC | Age: 77
End: 2022-05-09
Payer: MEDICARE

## 2022-05-09 VITALS
BODY MASS INDEX: 29.37 KG/M2 | HEIGHT: 62 IN | RESPIRATION RATE: 18 BRPM | SYSTOLIC BLOOD PRESSURE: 143 MMHG | OXYGEN SATURATION: 100 % | HEART RATE: 58 BPM | TEMPERATURE: 97.5 F | DIASTOLIC BLOOD PRESSURE: 75 MMHG | WEIGHT: 159.6 LBS

## 2022-05-09 DIAGNOSIS — Z12.31 ENCOUNTER FOR SCREENING MAMMOGRAM FOR MALIGNANT NEOPLASM OF BREAST: Primary | ICD-10-CM

## 2022-05-09 DIAGNOSIS — Z90.710 HISTORY OF HYSTERECTOMY: ICD-10-CM

## 2022-05-09 DIAGNOSIS — Z01.419 ENCOUNTER FOR GYNECOLOGICAL EXAMINATION WITHOUT ABNORMAL FINDING: ICD-10-CM

## 2022-05-09 LAB — MAMMOGRAPHY, EXTERNAL: NEGATIVE

## 2022-05-09 PROCEDURE — 1101F PT FALLS ASSESS-DOCD LE1/YR: CPT | Performed by: OBSTETRICS & GYNECOLOGY

## 2022-05-09 PROCEDURE — G8417 CALC BMI ABV UP PARAM F/U: HCPCS | Performed by: OBSTETRICS & GYNECOLOGY

## 2022-05-09 PROCEDURE — 1090F PRES/ABSN URINE INCON ASSESS: CPT | Performed by: OBSTETRICS & GYNECOLOGY

## 2022-05-09 PROCEDURE — 99397 PER PM REEVAL EST PAT 65+ YR: CPT | Performed by: OBSTETRICS & GYNECOLOGY

## 2022-05-09 PROCEDURE — G8510 SCR DEP NEG, NO PLAN REQD: HCPCS | Performed by: OBSTETRICS & GYNECOLOGY

## 2022-05-09 PROCEDURE — G9899 SCRN MAM PERF RSLTS DOC: HCPCS | Performed by: OBSTETRICS & GYNECOLOGY

## 2022-05-09 PROCEDURE — 3017F COLORECTAL CA SCREEN DOC REV: CPT | Performed by: OBSTETRICS & GYNECOLOGY

## 2022-05-09 NOTE — PROGRESS NOTES
Chief Complaint   Patient presents with    Annual Exam     1. \"Have you been to the ER, urgent care clinic since your last visit? Hospitalized since your last visit? \" No    2. \"Have you seen or consulted any other health care providers outside of the [address] [place] [address] since your last visit? \" No     3. For patients aged 39-70: Has the patient had a colonoscopy? Yes - no Care Gap present had cologuard 2 years ago  If the patient is female:    4. For patients aged 41-77: Has the patient had a mammogram within the past 2 years? Yes - no Care Gap present    5. For patients aged 21-65: Has the patient had a pap smear?  Yes - no Care Gap present     Visit Vitals  BP (!) 143/75 (BP 1 Location: Left arm, BP Patient Position: Sitting, BP Cuff Size: Adult)   Pulse (!) 58   Temp 97.5 °F (36.4 °C) (Temporal)   Resp 18   Ht 5' 2\" (1.575 m)   Wt 159 lb 9.6 oz (72.4 kg)   SpO2 100%   BMI 29.19 kg/m²

## 2022-05-09 NOTE — PROGRESS NOTES
HPI: Shilpa Vernon is a 78 y.o. female , JOSE C for uterine fibroids approximately 47 yrs ago, who presents today for the following:  Chief Complaint   Patient presents with    Annual Exam        She denies abnormal vaginal bleeding, vaginal/vulvar pruritus; abnormal vaginal discharge or vaginal odor. Denies h/o STIs or abnormal pap smears. Last mammogram: last year. Cologard 2 yrs ago- nml per pt. Past Medical History:   Diagnosis Date    Arthritis     Diabetes ([address]     Gout     High cholesterol        Past Surgical History:   Procedure Laterality Date    HX BREAST BIOPSY Right 10 yrs ago    benign    HX GI      intestinal    HX HYSTERECTOMY      JOSE C ~       Family History   Problem Relation Age of Onset    Brain cancer Mother    [facility] Breast Cancer Sister         unsure if genetic testing was performed    Ovarian Cancer Sister     Diabetes Father        Social History     Socioeconomic History    Marital status:      Spouse name: Not on file    Number of children: Not on file    Years of education: Not on file    Highest education level: 12th grade   Occupational History    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   Tobacco Use    Smoking status: Never Smoker    Smokeless tobacco: Never Used   Vaping Use    Vaping Use: Never used   Substance and Sexual Activity    Alcohol use: Not Currently    Drug use: Never    Sexual activity: Yes     Comment: denies h/o STIs or abnml pap smears   Other Topics Concern    Not on file   Social History Narrative    Not on file     Social Determinants of Health     Financial Resource Strain:     Difficulty of Paying Living Expenses: Not on file   Food Insecurity:     Worried About Running Out of Food in the Last Year: Not on file    Rebecca of Food in the Last Year: Not on file   Transportation Needs:     Lack of Transportation (Medical): Not on file    Lack of Transportation (Non-Medical):  Not on file Physical Activity: Insufficiently Active    Days of Exercise per Week: 7 days    Minutes of Exercise per Session: 20 min   Stress:     Feeling of Stress : Not on file   Social Connections:     Frequency of Communication with Friends and Family: Not on file    Frequency of Social Gatherings with Friends and Family: Not on file    Attends Restorationism Services: Not on file    Active Member of Clubs or Organizations: Not on file    Attends Club or Organization Meetings: Not on file    Marital Status: Not on file   Intimate Partner Violence: Not At Risk    Fear of Current or Ex-Partner: No    Emotionally Abused: No    Physically Abused: No    Sexually Abused: No   Housing Stability:     Unable to Pay for Housing in the Last Year: Not on file    Number of Places Lived in the Last Year: Not on file    Unstable Housing in the Last Year: Not on file       No Known Allergies       Current Outpatient Medications:     insulin glargine-lixisenatide (Soliqua 100/33) 100 unit-33 mcg/mL inpn, Inject 24 units in am, Disp: 9 mL, Rfl: 3    atorvastatin (LIPITOR) 20 mg tablet, Take 1 Tablet by mouth nightly for 90 days. , Disp: 90 Tablet, Rfl: 2    Insulin Needles, Disposable, (BD Trupti 2nd Gen Pen Needle) 32 gauge x 5/32\" ndle, once a day, 90 days, Disp: 100 Pen Needle, Rfl: 2    Accu-Chek Guide test strips strip, , Disp: , Rfl:     fenofibrate (LOFIBRA) 160 mg tablet, TAKE 1 TABLET BY MOUTH AT BEDTIME, Disp: , Rfl:     allopurinoL (ZYLOPRIM) 100 mg tablet, Take  by mouth daily. , Disp: , Rfl:     amLODIPine (NORVASC) 5 mg tablet, Take 5 mg by mouth daily. , Disp: , Rfl:     irbesartan-hydroCHLOROthiazide (AVALIDE) 150-12.5 mg per tablet, Take  by mouth., Disp: , Rfl:     diclofenac (VOLTAREN) 1 % gel, APPLY 2 GRAMS TOPICALLY TO THE AFFECTED AREA TWICE DAILY AS NEEDED, Disp: , Rfl:     isoniazid (NYDRAZID) 300 mg tablet, Take 1 Tab by mouth daily for 270 days.  Indications: treatment to prevent active tuberculosis infection, Disp: 80 Tab, Rfl: 2    ergocalciferol (Vitamin D2) 1,250 mcg (50,000 unit) capsule, Take 50,000 Units by mouth. (Patient not taking: Reported on 5/9/2022), Disp: , Rfl:          Review of Systems: Denies issues with eyes, ears, mouth, nose. Denies fevers/chills, significant weight loss/gain. Denies chest pain, shortness of breath, nausea, vomiting, diarrhea or abdominal pain. +Constipation. Denies dysuria. Denies muscle weakness, numbness or tingling. +Arthritis in knees. Denies issues with breasts. Denies bleeding/clotting d/o's. Denies anxiety/depression, S/HI. OBJECTIVE:  BP (!) 143/75 (BP 1 Location: Left arm, BP Patient Position: Sitting, BP Cuff Size: Adult)   Pulse (!) 58   Temp 97.5 °F (36.4 °C) (Temporal)   Resp 18   Ht 5' 2\" (1.575 m)   Wt 159 lb 9.6 oz (72.4 kg)   SpO2 100%   BMI 29.19 kg/m²      Constitutional  · Appearance: well-nourished, well developed, alert, in no acute distress    HENT  · Head and Face: appears normal    Neck  · Inspection/Palpation: normal appearance      Breasts   Symmetric, no palpable masses, no tenderness, no skin changes, no nipple abnormality, no nipple discharge, no axillary or supraclavicular lymphadenopathy.     Chest  · Respiratory Effort: normal      Gastrointestinal  · Abdominal Examination: abdomen non-tender to palpation, no masses present  · Liver and spleen: no hepatomegaly present, spleen not palpable      Genitourinary  · External Genitalia: normal appearance for age, no discharge present, no tenderness present, no inflammatory lesions present, no masses present; atrophy present  · Vagina: normal vaginal vault without central or paravaginal defects, no discharge present, no inflammatory lesions present, no masses present; atrophic  · Bladder: non-tender to palpation  · Urethra: appears normal  · Cervix: absent  · Uterus: absent  · Adnexa: no adnexal tenderness present, no adnexal masses present  · Perineum: perineum within normal limits, no evidence of trauma, no rashes or skin lesions present  · Anus: anus within normal limits, no hemorrhoids present    Skin  · General Inspection: no rash, no lesions identified    Neurologic/Psychiatric  · Mental Status:  · Orientation: grossly oriented to person, place and time  · Mood and Affect: mood normal, affect appropriate          Assessment/plan:    ICD-10-CM ICD-9-CM    1. Encounter for screening mammogram for malignant neoplasm of breast  Z12.31 V76.12 QUETA 3D WAQAR W MAMMO BI SCREENING INCL CAD   2. Encounter for gynecological examination without abnormal finding  Z01.419 V72.31    3. History of hysterectomy  Z90.710 V88.01         -Annual gynecologic exam.    -Cervical cancer screening-  status post total hysterectomy and w/o h/o maya 2/3 or greater so no need for further pap smears. -Breast cancer screening- breast awareness discussed; mammograms yearly. -STI screening-declines testing.    -Colon cancer screening- cologards with PCP. -Bone health-discussed weightbearing exercises, vitamin D and calcium supplementation. Will find out with PCP when next due for DEXA.

## 2022-05-09 NOTE — PATIENT INSTRUCTIONS
Preventing Osteoporosis: Care Instructions  Your Care Instructions     Osteoporosis means the bones are weak and thin enough that they can break easily. The older you are, the more likely you are to get osteoporosis. But with plenty of calcium, vitamin D, and exercise, you can help prevent osteoporosis. The preteen and teen years are a key time for bone building. With the help of calcium, vitamin D, and exercise in those early years and beyond, the bones reach their peak density and strength by age 27. After age 27, your bones naturally start to thin and weaken. The stronger your bones are at around age 27, the lower your risk for osteoporosis. But no matter what your age and risk are, your bones still need calcium, vitamin D, and exercise to stay strong. Also avoid smoking, and limit alcohol. Smoking and heavy alcohol use can make your bones thinner. Talk to your doctor about any special risks you might have, such as having a close relative with osteoporosis or taking a medicine that can weaken bones. Your doctor can tell you the best ways to protect your bones from thinning. Follow-up care is a key part of your treatment and safety. Be sure to make and go to all appointments, and call your doctor if you are having problems. It's also a good idea to know your test results and keep a list of the medicines you take. How can you care for yourself at home? Here are some things you can do to build and strengthen your bones:  · Get enough calcium and vitamin D.  ? Eat foods rich in calcium, like yogurt, cheese, milk, and dark green vegetables. ? Eat foods rich in vitamin D, like eggs, fatty fish, cereal, and fortified milk. ? Get some sunshine. Your body uses sunshine to make its own vitamin D.  ? Talk to your doctor about taking a calcium plus vitamin D supplement if you don't think you are getting enough through your diet and sunshine. Get enough calcium and vitamin D.  The recommended daily allowances (RDAs) for adults younger than age 46 are 1,000 mg of calcium and 600 IU of vitamin D each day. Women ages 46 to 79 need 1,200 mg of calcium and 600 IU of vitamin D each day. Men ages 46 to 79 need 1,000 mg of calcium and 600 IU of vitamin D each day. Adults 71 and older need 1,200 mg of calcium and 800 IU of vitamin D each day. It's not clear if people who already have osteoporosis need more calcium and vitamin D than this. Talk to your doctor about what's right for you. Eat foods rich in calcium, like yogurt, cheese, milk, and dark green vegetables. This is a good way to get the calcium you need. You can get vitamin D from eggs, fatty fish, cereal, and milk. Ask your doctor if you need to take a calcium plus vitamin D supplement. You may be able to get enough calcium and vitamin D through your diet. Be careful with supplements. Adults ages 23 to 48 should not get more than 2,500 mg of calcium and 4,000 IU of vitamin D each day, whether it is from supplements and/or food. Adults ages 46 and older should not get more than 2,000 mg of calcium and 4,000 IU of vitamin D each day from supplements and/or food. ?   · Get regular bone-building exercise. Walking, jogging, dancing, and lifting weights can make your bones stronger. · Limit alcohol to 2 drinks a day for men and 1 drink a day for women. · Don't smoke. Smoking can make bones thin faster. If you need help quitting, talk to your doctor about stop-smoking programs and medicines. These can increase your chances of quitting for good. When should you call for help? Watch closely for changes in your health, and be sure to contact your doctor if you have any problems. Where can you learn more? Go to http://www.gray.com/  Enter V742 in the search box to learn more about \"Preventing Osteoporosis: Care Instructions. \"  Current as of: September 8, 2021               Content Version: 13.2  © 2498-8663 Healthwise, [facility].    Care instructions adapted under license by [employer] (which disclaims liability or warranty for this information). If you have questions about a medical condition or this instruction, always ask your healthcare professional. [address] any warranty or liability for your use of this information.

## 2022-05-11 PROBLEM — N18.30 STAGE 3 CHRONIC KIDNEY DISEASE DUE TO TYPE 2 DIABETES MELLITUS (HCC): Status: ACTIVE | Noted: 2021-05-24
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
## 2022-05-11 PROBLEM — E11.9 TYPE II OR UNSPECIFIED TYPE DIABETES MELLITUS WITHOUT MENTION OF COMPLICATION, NOT STATED AS UNCONTROLLED: Status: ACTIVE | Noted: 2021-06-30

## 2022-05-11 PROBLEM — E11.22 STAGE 3 CHRONIC KIDNEY DISEASE DUE TO TYPE 2 DIABETES MELLITUS (HCC): Status: ACTIVE | Noted: 2021-05-24

## 2022-05-11 PROBLEM — Z90.710 HISTORY OF TOTAL HYSTERECTOMY: Status: ACTIVE | Noted: 2020-07-15

## 2022-05-11 PROBLEM — M06.4 INFLAMMATORY POLYARTHROPATHY (HCC): Status: ACTIVE | Noted: 2021-05-24

## 2022-05-11 PROBLEM — I12.9 MALIGNANT HYPERTENSIVE KIDNEY DISEASE WITH CHRONIC KIDNEY DISEASE STAGE I THROUGH STAGE IV, OR UNSPECIFIED: Status: ACTIVE | Noted: 2021-06-30

## 2022-05-11 RX ORDER — DICLOFENAC SODIUM 10 MG/G
GEL TOPICAL
COMMUNITY
Start: 2022-03-16

## 2022-06-02 ENCOUNTER — HOSPITAL ENCOUNTER (OUTPATIENT)
Dept: MAMMOGRAPHY | Age: 77
Discharge: HOME | End: 2022-06-02
Attending: OBSTETRICS & GYNECOLOGY
Payer: MEDICARE

## 2022-06-02 DIAGNOSIS — Z12.31 ENCOUNTER FOR SCREENING MAMMOGRAM FOR MALIGNANT NEOPLASM OF BREAST: ICD-10-CM

## 2022-06-02 PROCEDURE — 77063 BREAST TOMOSYNTHESIS BI: CPT

## 2022-06-03 ENCOUNTER — TELEPHONE (OUTPATIENT)
Dept: OBGYN CLINIC | Age: 77
End: 2022-06-03

## 2022-06-08 ENCOUNTER — OFFICE VISIT (OUTPATIENT)
Dept: ENDOCRINOLOGY | Age: 77
End: 2022-06-08
Payer: MEDICARE

## 2022-06-08 VITALS
BODY MASS INDEX: 29.09 KG/M2 | RESPIRATION RATE: 16 BRPM | WEIGHT: 158.1 LBS | HEART RATE: 68 BPM | DIASTOLIC BLOOD PRESSURE: 68 MMHG | SYSTOLIC BLOOD PRESSURE: 122 MMHG | HEIGHT: 62 IN | TEMPERATURE: 98 F | OXYGEN SATURATION: 92 %

## 2022-06-08 DIAGNOSIS — Z79.4 TYPE 2 DIABETES MELLITUS WITH HYPERGLYCEMIA, WITH LONG-TERM CURRENT USE OF INSULIN (HCC): Primary | ICD-10-CM

## 2022-06-08 DIAGNOSIS — E11.65 TYPE 2 DIABETES MELLITUS WITH HYPERGLYCEMIA, WITH LONG-TERM CURRENT USE OF INSULIN (HCC): Primary | ICD-10-CM

## 2022-06-08 DIAGNOSIS — I12.9 HYPERTENSIVE RENAL DISEASE: ICD-10-CM

## 2022-06-08 DIAGNOSIS — E78.2 MIXED HYPERLIPIDEMIA: ICD-10-CM

## 2022-06-08 LAB
GLUCOSE POC: 126 MG/DL
HBA1C MFR BLD HPLC: 7.7 %

## 2022-06-08 PROCEDURE — 82962 GLUCOSE BLOOD TEST: CPT | Performed by: INTERNAL MEDICINE

## 2022-06-08 PROCEDURE — 99214 OFFICE O/P EST MOD 30 MIN: CPT | Performed by: INTERNAL MEDICINE

## 2022-06-08 PROCEDURE — G8536 NO DOC ELDER MAL SCRN: HCPCS | Performed by: INTERNAL MEDICINE

## 2022-06-08 PROCEDURE — 1090F PRES/ABSN URINE INCON ASSESS: CPT | Performed by: INTERNAL MEDICINE

## 2022-06-08 PROCEDURE — 1101F PT FALLS ASSESS-DOCD LE1/YR: CPT | Performed by: INTERNAL MEDICINE

## 2022-06-08 PROCEDURE — G8417 CALC BMI ABV UP PARAM F/U: HCPCS | Performed by: INTERNAL MEDICINE

## 2022-06-08 PROCEDURE — 3051F HG A1C>EQUAL 7.0%<8.0%: CPT | Performed by: INTERNAL MEDICINE

## 2022-06-08 PROCEDURE — 1123F ACP DISCUSS/DSCN MKR DOCD: CPT | Performed by: INTERNAL MEDICINE

## 2022-06-08 PROCEDURE — 3017F COLORECTAL CA SCREEN DOC REV: CPT | Performed by: INTERNAL MEDICINE

## 2022-06-08 PROCEDURE — G8427 DOCREV CUR MEDS BY ELIG CLIN: HCPCS | Performed by: INTERNAL MEDICINE

## 2022-06-08 PROCEDURE — G8400 PT W/DXA NO RESULTS DOC: HCPCS | Performed by: INTERNAL MEDICINE

## 2022-06-08 PROCEDURE — G8510 SCR DEP NEG, NO PLAN REQD: HCPCS | Performed by: INTERNAL MEDICINE

## 2022-06-08 PROCEDURE — 2022F DILAT RTA XM EVC RTNOPTHY: CPT | Performed by: INTERNAL MEDICINE

## 2022-06-08 PROCEDURE — 83036 HEMOGLOBIN GLYCOSYLATED A1C: CPT | Performed by: INTERNAL MEDICINE

## 2022-06-08 RX ORDER — BLOOD SUGAR DIAGNOSTIC
STRIP MISCELLANEOUS
Qty: 100 STRIP | Refills: 5 | Status: SHIPPED | OUTPATIENT
Start: 2022-06-08

## 2022-06-08 NOTE — PROGRESS NOTES
Chief Complaint   Patient presents with    Diabetes     Visit Vitals  /68 (BP 1 Location: Right arm, BP Patient Position: Sitting, BP Cuff Size: Adult)   Pulse 68   Temp 98 °F (36.7 °C) (Oral)   Resp 16   Ht 5' 2\" (1.575 m)   Wt 158 lb 1.6 oz (71.7 kg)   SpO2 92%   BMI 28.92 kg/m²

## 2022-06-08 NOTE — LETTER
6/8/2022    Patient: Na Chiang   YOB: 1945   Date of Visit: 6/8/2022     Vallorie Hamman, MD  [address] [place] [address]  Via Fax: [phone]    Dear Vallorie Hamman, MD,      Thank you for referring Ms. Alva Rios to [address] [place] [address] for evaluation. My notes for this consultation are attached. If you have questions, please do not hesitate to call me. I look forward to following your patient along with you.       Sincerely,    Elva Granados MD

## 2022-06-08 NOTE — PROGRESS NOTES
History and Physical    Patient: Quentin Madrid MRN: [id number]  SSN: [id number]    YOB: 1945  Age: 78 y.o. Sex: female      Subjective:      Quentin Madrid is a 78 y.o. female with past medical history of hypertension, hyperlipidemia, inflammatory arthritis is here for follow-up of type 2 diabetes mellitus. She was sent to me by primary care physician Dr. Linda Robins.    At the last visit I stopped pioglitazone because of weight gain. She was switched from Lantus to [facility] 24 units in the morning. It is still a little expensive for her but it is doable. Also she received samples from her PCP. She is taking it regularly. She has lost 9 pounds since the last visit. Checking blood glucose usually once a day. Noticed one time her glucose went to 300s because she had peanut butter cookie. Denies any hypoglycemia. Regarding hyperlipidemia: She was on fenofibrate 160 mg and atorvastatin 20 mg daily but for some reason PCP advised her to cut atorvastatin in half and take it only every other day. Patient does not think she was having trouble with this medication, not sure why the dose was decreased. Up-to-date with diabetic eye exam.  She has inflammatory arthritis for which he sees Dr. Artie Lopes. She was given injection in her wrist and shoulder in feb 2021. She does not get steroids frequently. Glucometer reading: Checking blood glucose usually once a day.   Readings are ranging from 85 to 121 mg/dL    Updated diabetes history:  · Diagnosis: long time  · Current treatment: Soliqua 24 units in the morning  · Past treatment: glimepiride, metformin, glipizide, Toujeo (expensive), pioglitazone (weight gain), Lantus (switched to [facility])  · Glucose checks: 3 times a day, fasting 80-90s, lunch, : 200s, dinner: 300s,  · Hyperglycemia: yes  · Hypoglycemia: no  · Meals per day: 3, breakfast: sandwich, lunch: salad and ham/chicken, dinner: chicken, potatoes, snacks: crackers,   · Exercise: yes treadmill   · DM related hospitalizations: no    Complications of DM:  · CAD: no  · CVA: no  · PVD: no  · Amputations: no   · Retinopathy: no; last exam was 8-2020  · Gastropathy: no  · Nephropathy: ckd stage 3  · Neuropathy: no    Medications:  · Statin: atorvastatin 20, fenofibrate 160 mg  · ACE-I: no  · ASA: yes    · Diabetes education: no    Past Medical History:   Diagnosis Date    Arthritis     Diabetes ([address]     Gout     High cholesterol      Past Surgical History:   Procedure Laterality Date    HX BREAST BIOPSY Right 10 yrs ago    benign    HX GI      intestinal    HX HYSTERECTOMY      JOSE C ~1975      Family History   Problem Relation Age of Onset    Brain cancer Mother     Breast Cancer Sister         unsure if genetic testing was performed    Ovarian Cancer Sister     Diabetes Father      Social History     Tobacco Use    Smoking status: Never Smoker    Smokeless tobacco: Never Used   Substance Use Topics    Alcohol use: Not Currently      Prior to Admission medications    Medication Sig Start Date End Date Taking? Authorizing Provider   glucose blood VI test strips (Accu-Chek Guide test strips) strip Check glucose 3 times a day 6/8/22  Yes Maria G Wharton MD   diclofenac (VOLTAREN) 1 % gel APPLY 2 GRAMS TOPICALLY TO THE AFFECTED AREA TWICE DAILY AS NEEDED 3/16/22   Provider, Historical   insulin glargine-lixisenatide (Soliqua 100/33) 100 unit-33 mcg/mL inpn Inject 24 units in am 4/4/22   Maria G Wharton MD   Insulin Needles, Disposable, (BD Trupti 2nd Gen Pen Needle) 32 gauge x 5/32\" ndle once a day, 90 days 12/8/21   Maria G Wharton MD   Accu-Chek Guide test strips strip  5/20/21   Provider, Historical   fenofibrate (LOFIBRA) 160 mg tablet TAKE 1 TABLET BY MOUTH AT BEDTIME 4/12/21   Provider, Historical   isoniazid (NYDRAZID) 300 mg tablet Take 1 Tab by mouth daily for 270 days.  Indications: treatment to prevent active tuberculosis infection 5/10/21 2/4/22  Eros Swann MD   allopurinoL (ZYLOPRIM) 100 mg tablet Take  by mouth daily. Provider, Historical   amLODIPine (NORVASC) 5 mg tablet Take 5 mg by mouth daily. Provider, Historical   irbesartan-hydroCHLOROthiazide (AVALIDE) 150-12.5 mg per tablet Take  by mouth. Provider, Historical        No Known Allergies    Review of Systems:  ROS    A comprehensive review of systems was preformed and it is negative except mentioned in HPI    Objective:     Vitals:    06/08/22 1000   BP: 122/68   Pulse: 68   Resp: 16   Temp: 98 °F (36.7 °C)   TempSrc: Oral   SpO2: 92%   Weight: 158 lb 1.6 oz (71.7 kg)   Height: 5' 2\" (1.575 m)        Physical Exam:    Physical Exam  Vitals and nursing note reviewed. Constitutional:       Appearance: Normal appearance. HENT:      Head: Normocephalic and atraumatic. Cardiovascular:      Rate and Rhythm: Normal rate and regular rhythm. Pulmonary:      Effort: Pulmonary effort is normal.      Breath sounds: Normal breath sounds. Neurological:      Mental Status: She is alert. diabetic foot exam:  Bilateral diabetic foot exam was performed today. Dorsalis pedis pulses 2+ bilaterally. Monofilament sensation normal bilaterally. No ulcers or skin breakdown.     Labs and Imaging:    Last 3 Recorded Weights in this Encounter    06/08/22 1000   Weight: 158 lb 1.6 oz (71.7 kg)        No results found for: HBA1C, SVS3AYHT, LNC9KUCN, SLJ5ZQTH     Assessment:     Patient Active Problem List   Diagnosis Code    History of total hysterectomy Z90.710    Menopausal syndrome N95.1    Uncontrolled type 2 diabetes mellitus [id number]    Stage 3 chronic kidney disease due to type 2 diabetes mellitus (HCC) E11.22, N18.30    Hypertensive renal disease I12.9    Mixed hyperlipidemia E78.2    Inflammatory polyarthropathy ([facility] M06.4    Malignant hypertensive kidney disease with chronic kidney disease stage I through stage IV, or unspecified I12.9    Type II or unspecified type diabetes mellitus without mention of complication, not stated as uncontrolled E11.9           Plan:     Type 2 diabetes mellitus, uncontrolled:  Hemoglobin A1c was 7% on 3-9-2021, 8.6% on 5-, 6.9% on 9-8-2021, 8.7% on 12--8--2021, 6.5% on 3-1-2022, 7.7% today. Fingerstick blood glucose is 126 mg/dL in my office today. Up to date with diabetes related annual labs: November 2021, March 2022  Up to date with diabetic eye exam: August 2020  GFR was 41 in March 2021, 37 in November 2021, 44 in March 2022  Plan:  Given patient's age and comorbidities, avoiding hypoglycemia is important. Advised patient to continue to work on diabetic diet. Glucose control has improved. Hemoglobin A1c is worse because of intermittent hyperglycemia that she had when she was noncompliant with diet. Overall her glucose readings look good. Continue Soliqua 24 units in the morning. Check blood glucose twice a day every day and follow-up with PCP in 3 months. CKD stage III:  GFR 41 in March 2021, 37 in November 2021, 44 in March 2022. Last appointment with nephrologist was in May 2022. Hypertensive renal disease:  Blood pressure well controlled on current medications. Continue the same. Mixed hyperlipidemia:  3-9-2021: Total cholesterol 183, triglycerides 98, .  11-: Total cholesterol 159, triglycerides 82, LDL 90. Currently on atorvastatin 20 mg and fenofibrate 160 mg, has been on these medications since a long time. For some reason atorvastatin was decreased to half tablet every other day and patient is taking it in the morning. 3-1-2022: Total cholesterol 180, triglycerides 68, . Advised patient to increase atorvastatin back to 20 mg and take it every day at bedtime. Patient expressed understanding.      Inflammatory polyarthritis:  Seeing rheumatologist.    Orders Placed This Encounter    AMB POC GLUCOSE BLOOD, BY GLUCOSE MONITORING DEVICE    AMB POC HEMOGLOBIN A1C    glucose blood VI test strips (Accu-Chek Guide test strips) strip     Sig: Check glucose 3 times a day     Dispense:  100 Strip     Refill:  5        Signed By: Uyen Conrad MD     June 8, 2022      Return to clinic

## 2022-06-30 ENCOUNTER — TELEPHONE (OUTPATIENT)
Dept: ENDOCRINOLOGY | Age: 77
End: 2022-06-30

## 2022-06-30 NOTE — TELEPHONE ENCOUNTER
Patient called in stated that she went to [facility] and they prescribed her a bunch of medication and one of the prescription is for a steroid that she knows will make her sugar go up. She wants to know what to do about taking the medication and her sugar levels.  She has not taken any of the medication that [facility] prescribed to her because she wanted to hear from you first.

## 2022-06-30 NOTE — TELEPHONE ENCOUNTER
Spoke to patient. She has COVID, went to [facility] and she was prescribed medications including steroids. Advised patient to start taking the medications. Currently her glucose has been in high 100s to low 200s. Advised patient to increase Soliqua from 24 units to 30 units daily, starting tomorrow morning. Patient expressed understanding.

## 2022-09-30 ENCOUNTER — TRANSCRIBE ORDER (OUTPATIENT)
Dept: SCHEDULING | Age: 77
End: 2022-09-30

## 2022-09-30 DIAGNOSIS — I10 BENIGN HYPERTENSION: ICD-10-CM

## 2022-09-30 DIAGNOSIS — E83.52 HYPERCALCEMIA: ICD-10-CM

## 2022-09-30 DIAGNOSIS — N18.32 STAGE 3B CHRONIC KIDNEY DISEASE (HCC): Primary | ICD-10-CM

## 2022-09-30 DIAGNOSIS — N18.9 ANEMIA IN CHRONIC RENAL DISEASE: ICD-10-CM

## 2022-09-30 DIAGNOSIS — D63.1 ANEMIA IN CHRONIC RENAL DISEASE: ICD-10-CM

## 2022-10-05 ENCOUNTER — HOSPITAL ENCOUNTER (OUTPATIENT)
Dept: ULTRASOUND IMAGING | Age: 77
Discharge: HOME | End: 2022-10-05
Attending: LEGAL MEDICINE
Payer: MEDICARE

## 2022-10-05 DIAGNOSIS — E83.52 HYPERCALCEMIA: ICD-10-CM

## 2022-10-05 DIAGNOSIS — N18.32 STAGE 3B CHRONIC KIDNEY DISEASE (HCC): ICD-10-CM

## 2022-10-05 DIAGNOSIS — D63.1 ANEMIA IN CHRONIC RENAL DISEASE: ICD-10-CM

## 2022-10-05 DIAGNOSIS — I10 BENIGN HYPERTENSION: ICD-10-CM

## 2022-10-05 DIAGNOSIS — N18.9 ANEMIA IN CHRONIC RENAL DISEASE: ICD-10-CM

## 2022-10-05 PROCEDURE — 76770 US EXAM ABDO BACK WALL COMP: CPT

## 2023-05-18 RX ORDER — IRBESARTAN AND HYDROCHLOROTHIAZIDE 150; 12.5 MG/1; MG/1
1 TABLET, FILM COATED ORAL DAILY
COMMUNITY

## 2023-05-18 RX ORDER — ALLOPURINOL 100 MG/1
1 TABLET ORAL DAILY
COMMUNITY

## 2023-05-18 RX ORDER — FENOFIBRATE 160 MG/1
1 TABLET ORAL NIGHTLY
COMMUNITY
Start: 2021-04-12

## 2023-05-18 RX ORDER — AMLODIPINE BESYLATE 5 MG/1
5 TABLET ORAL DAILY
COMMUNITY

## 2023-05-18 RX ORDER — ISONIAZID 300 MG/1
300 TABLET ORAL DAILY
COMMUNITY
Start: 2021-05-10

## 2023-05-18 RX ORDER — PIOGLITAZONEHYDROCHLORIDE 15 MG/1
15 TABLET ORAL DAILY
COMMUNITY

## 2023-05-18 RX ORDER — ATORVASTATIN CALCIUM 20 MG/1
1 TABLET, FILM COATED ORAL DAILY
COMMUNITY

## 2023-05-18 RX ORDER — LEFLUNOMIDE 20 MG/1
20 TABLET ORAL DAILY
COMMUNITY

## 2023-05-18 RX ORDER — INSULIN GLARGINE AND LIXISENATIDE 100; 33 U/ML; UG/ML
24 INJECTION, SOLUTION SUBCUTANEOUS DAILY
COMMUNITY
Start: 2022-04-04

## 2023-05-18 RX ORDER — REPAGLINIDE 2 MG/1
2 TABLET ORAL
COMMUNITY

## 2023-05-25 ENCOUNTER — TRANSCRIBE ORDERS (OUTPATIENT)
Facility: HOSPITAL | Age: 78
End: 2023-05-25

## 2023-05-25 ENCOUNTER — OFFICE VISIT (OUTPATIENT)
Age: 78
End: 2023-05-25
Payer: MEDICARE

## 2023-05-25 VITALS
RESPIRATION RATE: 16 BRPM | SYSTOLIC BLOOD PRESSURE: 146 MMHG | BODY MASS INDEX: 29.49 KG/M2 | HEIGHT: 62 IN | TEMPERATURE: 97.8 F | HEART RATE: 60 BPM | DIASTOLIC BLOOD PRESSURE: 66 MMHG | WEIGHT: 160.25 LBS | OXYGEN SATURATION: 100 %

## 2023-05-25 DIAGNOSIS — Z90.710 SCREENING FOR MALIGNANT NEOPLASM OF VAGINA AFTER TOTAL HYSTERECTOMY: ICD-10-CM

## 2023-05-25 DIAGNOSIS — Z12.72 SCREENING FOR MALIGNANT NEOPLASM OF VAGINA AFTER TOTAL HYSTERECTOMY: ICD-10-CM

## 2023-05-25 DIAGNOSIS — Z01.419 ENCOUNTER FOR GYNECOLOGICAL EXAMINATION (GENERAL) (ROUTINE) WITHOUT ABNORMAL FINDINGS: Primary | ICD-10-CM

## 2023-05-25 DIAGNOSIS — Z12.31 SCREENING MAMMOGRAM FOR HIGH-RISK PATIENT: Primary | ICD-10-CM

## 2023-05-25 DIAGNOSIS — D22.9 MULTIPLE BENIGN NEVI: ICD-10-CM

## 2023-05-25 DIAGNOSIS — Z12.39 SCREENING BREAST EXAMINATION: ICD-10-CM

## 2023-05-25 PROCEDURE — 99387 INIT PM E/M NEW PAT 65+ YRS: CPT | Performed by: OBSTETRICS & GYNECOLOGY

## 2023-05-25 RX ORDER — PEN NEEDLE, DIABETIC 32GX 5/32"
NEEDLE, DISPOSABLE MISCELLANEOUS
COMMUNITY
Start: 2023-04-05

## 2023-05-25 RX ORDER — GOLIMUMAB 50 MG/4ML
SOLUTION INTRAVENOUS
COMMUNITY
Start: 2023-04-17

## 2023-05-25 RX ORDER — FAMCICLOVIR 500 MG/1
TABLET ORAL
COMMUNITY
Start: 2023-04-12

## 2023-05-25 RX ORDER — GABAPENTIN 300 MG/1
300 CAPSULE ORAL 2 TIMES DAILY
COMMUNITY
Start: 2023-05-17

## 2023-05-25 RX ORDER — TRIAMCINOLONE ACETONIDE 5 MG/G
CREAM TOPICAL
COMMUNITY
Start: 2023-03-20

## 2023-05-30 ENCOUNTER — TELEPHONE (OUTPATIENT)
Age: 78
End: 2023-05-30

## 2023-05-30 NOTE — TELEPHONE ENCOUNTER
Called and spoke with patient in reference to the referral for dermatology, provided patient with name and contact number of provider and sent referral and insurance card to provider thru chart. Advised patient to contact office to schedule an appointment if she doesn't hear from them. Dr. Mar Geiger, Dermatology  [phone].

## 2023-05-31 LAB
CYTOLOGIST CVX/VAG CYTO: NORMAL
CYTOLOGY CVX/VAG DOC CYTO: NORMAL
CYTOLOGY CVX/VAG DOC THIN PREP: NORMAL
DX ICD CODE: NORMAL
HPV GENOTYPE REFLEX: NORMAL
HPV I/H RISK 4 DNA CVX QL PROBE+SIG AMP: NEGATIVE
Lab: NORMAL
OTHER STN SPEC: NORMAL
STAT OF ADQ CVX/VAG CYTO-IMP: NORMAL

## 2023-06-08 ENCOUNTER — HOSPITAL ENCOUNTER (OUTPATIENT)
Facility: HOSPITAL | Age: 78
Discharge: HOME | End: 2023-06-08
Attending: OBSTETRICS & GYNECOLOGY
Payer: MEDICARE

## 2023-06-08 DIAGNOSIS — Z12.31 SCREENING MAMMOGRAM FOR HIGH-RISK PATIENT: ICD-10-CM

## 2023-06-08 PROCEDURE — 77063 BREAST TOMOSYNTHESIS BI: CPT

## 2023-06-30 ENCOUNTER — HOSPITAL ENCOUNTER (OUTPATIENT)
Facility: HOSPITAL | Age: 78
Setting detail: OUTPATIENT SURGERY
Discharge: HOME | End: 2023-06-30
Attending: INTERNAL MEDICINE | Admitting: INTERNAL MEDICINE
Payer: MEDICARE

## 2023-06-30 ENCOUNTER — ANESTHESIA (OUTPATIENT)
Facility: HOSPITAL | Age: 78
End: 2023-06-30
Payer: MEDICARE

## 2023-06-30 ENCOUNTER — ANESTHESIA EVENT (OUTPATIENT)
Facility: HOSPITAL | Age: 78
End: 2023-06-30
Payer: MEDICARE

## 2023-06-30 VITALS
OXYGEN SATURATION: 98 % | RESPIRATION RATE: 16 BRPM | HEART RATE: 68 BPM | BODY MASS INDEX: 29.44 KG/M2 | SYSTOLIC BLOOD PRESSURE: 140 MMHG | TEMPERATURE: 97.3 F | HEIGHT: 62 IN | WEIGHT: 160 LBS | DIASTOLIC BLOOD PRESSURE: 68 MMHG

## 2023-06-30 DIAGNOSIS — Z12.11 COLON CANCER SCREENING: ICD-10-CM

## 2023-06-30 LAB
GLUCOSE BLD STRIP.AUTO-MCNC: 93 MG/DL (ref 65–100)
PERFORMED BY:: NORMAL

## 2023-06-30 PROCEDURE — 3600007502: Performed by: INTERNAL MEDICINE

## 2023-06-30 PROCEDURE — 3600007512: Performed by: INTERNAL MEDICINE

## 2023-06-30 PROCEDURE — 82962 GLUCOSE BLOOD TEST: CPT

## 2023-06-30 PROCEDURE — 7100000011 HC PHASE II RECOVERY - ADDTL 15 MIN: Performed by: INTERNAL MEDICINE

## 2023-06-30 PROCEDURE — 3700000001 HC ADD 15 MINUTES (ANESTHESIA): Performed by: INTERNAL MEDICINE

## 2023-06-30 PROCEDURE — 2500000003 HC RX 250 WO HCPCS: Performed by: NURSE ANESTHETIST, CERTIFIED REGISTERED

## 2023-06-30 PROCEDURE — 3700000000 HC ANESTHESIA ATTENDED CARE: Performed by: INTERNAL MEDICINE

## 2023-06-30 PROCEDURE — 6360000002 HC RX W HCPCS: Performed by: NURSE ANESTHETIST, CERTIFIED REGISTERED

## 2023-06-30 PROCEDURE — 2580000003 HC RX 258: Performed by: INTERNAL MEDICINE

## 2023-06-30 PROCEDURE — 7100000010 HC PHASE II RECOVERY - FIRST 15 MIN: Performed by: INTERNAL MEDICINE

## 2023-06-30 PROCEDURE — 88305 TISSUE EXAM BY PATHOLOGIST: CPT

## 2023-06-30 PROCEDURE — 2709999900 HC NON-CHARGEABLE SUPPLY: Performed by: INTERNAL MEDICINE

## 2023-06-30 RX ORDER — SODIUM CHLORIDE 9 MG/ML
INJECTION, SOLUTION INTRAVENOUS CONTINUOUS
Status: DISCONTINUED | OUTPATIENT
Start: 2023-06-30 | End: 2023-06-30 | Stop reason: HOSPADM

## 2023-06-30 RX ORDER — PROPOFOL 10 MG/ML
INJECTION, EMULSION INTRAVENOUS PRN
Status: DISCONTINUED | OUTPATIENT
Start: 2023-06-30 | End: 2023-06-30 | Stop reason: SDUPTHER

## 2023-06-30 RX ORDER — LIDOCAINE HYDROCHLORIDE 20 MG/ML
INJECTION, SOLUTION EPIDURAL; INFILTRATION; INTRACAUDAL; PERINEURAL PRN
Status: DISCONTINUED | OUTPATIENT
Start: 2023-06-30 | End: 2023-06-30 | Stop reason: SDUPTHER

## 2023-06-30 RX ADMIN — PROPOFOL 20 MG: 10 INJECTION, EMULSION INTRAVENOUS at 10:35

## 2023-06-30 RX ADMIN — SODIUM CHLORIDE: 9 INJECTION, SOLUTION INTRAVENOUS at 09:28

## 2023-06-30 RX ADMIN — PROPOFOL 30 MG: 10 INJECTION, EMULSION INTRAVENOUS at 10:31

## 2023-06-30 RX ADMIN — PROPOFOL 30 MG: 10 INJECTION, EMULSION INTRAVENOUS at 10:30

## 2023-06-30 RX ADMIN — PROPOFOL 30 MG: 10 INJECTION, EMULSION INTRAVENOUS at 10:29

## 2023-06-30 RX ADMIN — PROPOFOL 20 MG: 10 INJECTION, EMULSION INTRAVENOUS at 10:33

## 2023-06-30 RX ADMIN — LIDOCAINE HYDROCHLORIDE 70 MG: 20 INJECTION, SOLUTION EPIDURAL; INFILTRATION; INTRACAUDAL; PERINEURAL at 10:28

## 2023-06-30 RX ADMIN — PROPOFOL 30 MG: 10 INJECTION, EMULSION INTRAVENOUS at 10:28

## 2023-06-30 ASSESSMENT — PAIN - FUNCTIONAL ASSESSMENT: PAIN_FUNCTIONAL_ASSESSMENT: 0-10

## 2023-09-27 ENCOUNTER — OFFICE VISIT (OUTPATIENT)
Age: 78
End: 2023-09-27
Payer: MEDICARE

## 2023-09-27 VITALS
OXYGEN SATURATION: 99 % | SYSTOLIC BLOOD PRESSURE: 134 MMHG | DIASTOLIC BLOOD PRESSURE: 66 MMHG | HEART RATE: 63 BPM | RESPIRATION RATE: 18 BRPM | WEIGHT: 163.1 LBS | TEMPERATURE: 98 F | BODY MASS INDEX: 30.01 KG/M2 | HEIGHT: 62 IN

## 2023-09-27 DIAGNOSIS — E11.65 TYPE 2 DIABETES MELLITUS WITH HYPERGLYCEMIA, WITH LONG-TERM CURRENT USE OF INSULIN (HCC): Primary | ICD-10-CM

## 2023-09-27 DIAGNOSIS — Z79.4 TYPE 2 DIABETES MELLITUS WITH HYPERGLYCEMIA, WITH LONG-TERM CURRENT USE OF INSULIN (HCC): Primary | ICD-10-CM

## 2023-09-27 DIAGNOSIS — E78.2 MIXED HYPERLIPIDEMIA: ICD-10-CM

## 2023-09-27 DIAGNOSIS — N18.30 STAGE 3 CHRONIC KIDNEY DISEASE DUE TO TYPE 2 DIABETES MELLITUS (HCC): ICD-10-CM

## 2023-09-27 DIAGNOSIS — E11.22 STAGE 3 CHRONIC KIDNEY DISEASE DUE TO TYPE 2 DIABETES MELLITUS (HCC): ICD-10-CM

## 2023-09-27 LAB — GLUCOSE, POC: 158 MG/DL

## 2023-09-27 PROCEDURE — 82962 GLUCOSE BLOOD TEST: CPT | Performed by: STUDENT IN AN ORGANIZED HEALTH CARE EDUCATION/TRAINING PROGRAM

## 2023-09-27 PROCEDURE — G8419 CALC BMI OUT NRM PARAM NOF/U: HCPCS | Performed by: STUDENT IN AN ORGANIZED HEALTH CARE EDUCATION/TRAINING PROGRAM

## 2023-09-27 PROCEDURE — 1090F PRES/ABSN URINE INCON ASSESS: CPT | Performed by: STUDENT IN AN ORGANIZED HEALTH CARE EDUCATION/TRAINING PROGRAM

## 2023-09-27 PROCEDURE — 1036F TOBACCO NON-USER: CPT | Performed by: STUDENT IN AN ORGANIZED HEALTH CARE EDUCATION/TRAINING PROGRAM

## 2023-09-27 PROCEDURE — 1123F ACP DISCUSS/DSCN MKR DOCD: CPT | Performed by: STUDENT IN AN ORGANIZED HEALTH CARE EDUCATION/TRAINING PROGRAM

## 2023-09-27 PROCEDURE — G8400 PT W/DXA NO RESULTS DOC: HCPCS | Performed by: STUDENT IN AN ORGANIZED HEALTH CARE EDUCATION/TRAINING PROGRAM

## 2023-09-27 PROCEDURE — G8427 DOCREV CUR MEDS BY ELIG CLIN: HCPCS | Performed by: STUDENT IN AN ORGANIZED HEALTH CARE EDUCATION/TRAINING PROGRAM

## 2023-09-27 PROCEDURE — 99213 OFFICE O/P EST LOW 20 MIN: CPT | Performed by: STUDENT IN AN ORGANIZED HEALTH CARE EDUCATION/TRAINING PROGRAM

## 2023-09-27 RX ORDER — CETIRIZINE HYDROCHLORIDE 10 MG/1
10 TABLET ORAL DAILY
COMMUNITY
Start: 2023-09-21 | End: 2023-09-27

## 2023-09-27 RX ORDER — OMEPRAZOLE 40 MG/1
40 CAPSULE, DELAYED RELEASE ORAL DAILY
COMMUNITY
Start: 2023-09-21 | End: 2023-09-27

## 2023-09-27 RX ORDER — IRBESARTAN 150 MG/1
150 TABLET ORAL EVERY EVENING
COMMUNITY
Start: 2023-09-21 | End: 2023-09-27

## 2023-09-27 RX ORDER — DULOXETIN HYDROCHLORIDE 30 MG/1
CAPSULE, DELAYED RELEASE ORAL DAILY
COMMUNITY
Start: 2023-08-21 | End: 2023-09-27

## 2023-09-27 NOTE — PATIENT INSTRUCTIONS
Continue soliquia 15 units daily  Continue actos(pioglitazone) 15 mg orally daily  Take repaglinide only if eating a carbohydrate heavy meal   Discuss lower dose of fenofibrate

## 2023-12-28 ENCOUNTER — OFFICE VISIT (OUTPATIENT)
Age: 78
End: 2023-12-28
Payer: MEDICARE

## 2023-12-28 VITALS
BODY MASS INDEX: 30.62 KG/M2 | DIASTOLIC BLOOD PRESSURE: 63 MMHG | HEIGHT: 62 IN | SYSTOLIC BLOOD PRESSURE: 141 MMHG | TEMPERATURE: 97.6 F | HEART RATE: 62 BPM | OXYGEN SATURATION: 97 % | WEIGHT: 166.4 LBS

## 2023-12-28 DIAGNOSIS — N18.30 STAGE 3 CHRONIC KIDNEY DISEASE DUE TO TYPE 2 DIABETES MELLITUS (HCC): ICD-10-CM

## 2023-12-28 DIAGNOSIS — E11.22 STAGE 3 CHRONIC KIDNEY DISEASE DUE TO TYPE 2 DIABETES MELLITUS (HCC): ICD-10-CM

## 2023-12-28 DIAGNOSIS — E11.65 TYPE 2 DIABETES MELLITUS WITH HYPERGLYCEMIA, WITH LONG-TERM CURRENT USE OF INSULIN (HCC): Primary | ICD-10-CM

## 2023-12-28 DIAGNOSIS — E11.65 TYPE 2 DIABETES MELLITUS WITH HYPERGLYCEMIA, WITH LONG-TERM CURRENT USE OF INSULIN (HCC): ICD-10-CM

## 2023-12-28 DIAGNOSIS — Z79.4 TYPE 2 DIABETES MELLITUS WITH HYPERGLYCEMIA, WITH LONG-TERM CURRENT USE OF INSULIN (HCC): Primary | ICD-10-CM

## 2023-12-28 DIAGNOSIS — Z79.4 TYPE 2 DIABETES MELLITUS WITH HYPERGLYCEMIA, WITH LONG-TERM CURRENT USE OF INSULIN (HCC): ICD-10-CM

## 2023-12-28 LAB — GLUCOSE, POC: 170 MG/DL

## 2023-12-28 PROCEDURE — 1036F TOBACCO NON-USER: CPT | Performed by: STUDENT IN AN ORGANIZED HEALTH CARE EDUCATION/TRAINING PROGRAM

## 2023-12-28 PROCEDURE — 1123F ACP DISCUSS/DSCN MKR DOCD: CPT | Performed by: STUDENT IN AN ORGANIZED HEALTH CARE EDUCATION/TRAINING PROGRAM

## 2023-12-28 PROCEDURE — 1090F PRES/ABSN URINE INCON ASSESS: CPT | Performed by: STUDENT IN AN ORGANIZED HEALTH CARE EDUCATION/TRAINING PROGRAM

## 2023-12-28 PROCEDURE — G8427 DOCREV CUR MEDS BY ELIG CLIN: HCPCS | Performed by: STUDENT IN AN ORGANIZED HEALTH CARE EDUCATION/TRAINING PROGRAM

## 2023-12-28 PROCEDURE — G8484 FLU IMMUNIZE NO ADMIN: HCPCS | Performed by: STUDENT IN AN ORGANIZED HEALTH CARE EDUCATION/TRAINING PROGRAM

## 2023-12-28 PROCEDURE — G8400 PT W/DXA NO RESULTS DOC: HCPCS | Performed by: STUDENT IN AN ORGANIZED HEALTH CARE EDUCATION/TRAINING PROGRAM

## 2023-12-28 PROCEDURE — 82962 GLUCOSE BLOOD TEST: CPT | Performed by: STUDENT IN AN ORGANIZED HEALTH CARE EDUCATION/TRAINING PROGRAM

## 2023-12-28 PROCEDURE — G8417 CALC BMI ABV UP PARAM F/U: HCPCS | Performed by: STUDENT IN AN ORGANIZED HEALTH CARE EDUCATION/TRAINING PROGRAM

## 2023-12-28 PROCEDURE — 99213 OFFICE O/P EST LOW 20 MIN: CPT | Performed by: STUDENT IN AN ORGANIZED HEALTH CARE EDUCATION/TRAINING PROGRAM

## 2023-12-28 RX ORDER — PIOGLITAZONEHYDROCHLORIDE 15 MG/1
15 TABLET ORAL DAILY
Qty: 90 TABLET | Refills: 0 | Status: SHIPPED | OUTPATIENT
Start: 2023-12-28 | End: 2024-03-27

## 2023-12-28 RX ORDER — IRBESARTAN 150 MG/1
150 TABLET ORAL NIGHTLY
COMMUNITY

## 2023-12-28 RX ORDER — CETIRIZINE HYDROCHLORIDE 10 MG/1
10 TABLET ORAL DAILY
COMMUNITY
Start: 2023-11-12 | End: 2023-12-28

## 2023-12-28 NOTE — PATIENT INSTRUCTIONS
Please get hemoglobin a1c checked   Continue soliqua ( insulin) 15 units daily  Continue pioglitazone (actos) 15 mg daily  If blood sugar is not under control, we will increase pioglitazone to 30 mg daily   Ok to stop repaglinide  Discuss fenofibrate - consider lower dose

## 2024-03-29 ENCOUNTER — OFFICE VISIT (OUTPATIENT)
Age: 79
End: 2024-03-29

## 2024-03-29 VITALS
WEIGHT: 167 LBS | HEIGHT: 62 IN | TEMPERATURE: 97.3 F | DIASTOLIC BLOOD PRESSURE: 64 MMHG | HEART RATE: 62 BPM | SYSTOLIC BLOOD PRESSURE: 135 MMHG | OXYGEN SATURATION: 97 % | RESPIRATION RATE: 16 BRPM | BODY MASS INDEX: 30.73 KG/M2

## 2024-03-29 DIAGNOSIS — I10 PRIMARY HYPERTENSION: ICD-10-CM

## 2024-03-29 DIAGNOSIS — E11.22 STAGE 3 CHRONIC KIDNEY DISEASE DUE TO TYPE 2 DIABETES MELLITUS (HCC): ICD-10-CM

## 2024-03-29 DIAGNOSIS — Z79.4 TYPE 2 DIABETES MELLITUS WITH HYPERGLYCEMIA, WITH LONG-TERM CURRENT USE OF INSULIN (HCC): Primary | ICD-10-CM

## 2024-03-29 DIAGNOSIS — N18.30 STAGE 3 CHRONIC KIDNEY DISEASE DUE TO TYPE 2 DIABETES MELLITUS (HCC): ICD-10-CM

## 2024-03-29 DIAGNOSIS — E11.65 TYPE 2 DIABETES MELLITUS WITH HYPERGLYCEMIA, WITH LONG-TERM CURRENT USE OF INSULIN (HCC): Primary | ICD-10-CM

## 2024-03-29 DIAGNOSIS — E78.2 MIXED HYPERLIPIDEMIA: ICD-10-CM

## 2024-03-29 LAB
GLUCOSE, POC: 166 MG/DL
HBA1C MFR BLD: 8.1 %

## 2024-03-29 RX ORDER — BLOOD SUGAR DIAGNOSTIC
STRIP MISCELLANEOUS
Qty: 300 EACH | Refills: 3 | Status: SHIPPED | OUTPATIENT
Start: 2024-03-29

## 2024-03-29 RX ORDER — PIOGLITAZONEHYDROCHLORIDE 30 MG/1
30 TABLET ORAL DAILY
Qty: 30 TABLET | Refills: 3 | Status: SHIPPED | OUTPATIENT
Start: 2024-03-29

## 2024-03-29 NOTE — PROGRESS NOTES
Chief Complaint   Patient presents with    Follow-up    Diabetes     BP (!) 145/67 (Site: Left Upper Arm, Position: Sitting, Cuff Size: Large Adult)   Pulse 62   Temp 97.3 °F (36.3 °C) (Temporal)   Resp 16   Ht 1.575 m (5' 2\")   Wt 75.8 kg (167 lb)   SpO2 97%   BMI 30.54 kg/m²      BP (!) 145/67 (Site: Left Upper Arm, Position: Sitting, Cuff Size: Large Adult)   Pulse 62   Temp 97.3 °F (36.3 °C) (Temporal)   Resp 16   Ht 1.575 m (5' 2\")   Wt 75.8 kg (167 lb)   SpO2 97%   BMI 30.54 kg/m²     
results found for: \"GFRAA\", \"CREA\", \"CREAPOC\", \"BUN\", \"IBUN\", \"BUNPOC\", \"NA\", \"NAPOC\", \"K\", \"KPOCT\", \"CL\", \"CLPOC\", \"CO2\", \"CO2POC\", \"MG\", \"PHOS\", \"ALBEU\", \"PTH\", \"EPO\"    Results for orders placed or performed in visit on 03/29/24   AMB POC HEMOGLOBIN A1C   Result Value Ref Range    Hemoglobin A1C, POC 8.1 %   AMB POC GLUCOSE BLOOD, BY GLUCOSE MONITORING DEVICE   Result Value Ref Range    Glucose,  MG/DL       Results for orders placed or performed in visit on 03/29/24   AMB POC HEMOGLOBIN A1C   Result Value Ref Range    Hemoglobin A1C, POC 8.1 %   AMB POC GLUCOSE BLOOD, BY GLUCOSE MONITORING DEVICE   Result Value Ref Range    Glucose,  MG/DL           Assessment/Plan:     1. Type 2 diabetes mellitus with hyperglycemia, with long-term current use of insulin (HCC)          Orders Placed This Encounter   Procedures    AMB POC HEMOGLOBIN A1C    AMB POC GLUCOSE BLOOD, BY GLUCOSE MONITORING DEVICE       Type 2 Diabetes Mellitus -uncontrolled complicated by CKD  -goal A1c  in this 78 y o f with CKD is 7.5-8% without hypoglycemia   -A1c 6/16/23 - 6.7%  -A1cc 9/15/23 - 7.3%   Unable to check A1c - latest Hb was 8.8   A1c 8.1 3/28     Recommendations   Continue soliquia - I would recommend 24 units daily   Increase actos 30 mg watch for leg swelling and hypoglycemia   Will send maci sensor to DME  Patient has a diagnosis of diabetes;   Patient is currently treated with daily administrations of insulin   Patient requires frequent adjustment to the insulin treatment regimen based on glucose results (either finger stick or CGM readings);   Patient was seen for diabetes management in past 6 months.    If Bg uncontrolled- will consider increasing actos   -Counseled on hypoglycemia  -Counseled on follow up with podiatry and ophthalmology   -Counseled on lifestyle measures       2. Hyperlipidemia  She is on atorvastatin 20 mg orally daily  She is also on fenofibrate 160 mg-   Last cholesterol panel is from 9/15/23 -

## 2024-03-29 NOTE — PATIENT INSTRUCTIONS
We will send continuous glucose sensor to the medical company. They will call you- ask them about  copay and duration of copay.   Once you get the sensor if you need help putting it on, we can do a nurse visit to help you and show you how to put it on  Increase actos to 30 mg

## 2024-06-07 ENCOUNTER — OFFICE VISIT (OUTPATIENT)
Age: 79
End: 2024-06-07
Payer: MEDICARE

## 2024-06-07 VITALS
TEMPERATURE: 97.3 F | RESPIRATION RATE: 18 BRPM | HEART RATE: 61 BPM | OXYGEN SATURATION: 99 % | DIASTOLIC BLOOD PRESSURE: 60 MMHG | WEIGHT: 170.8 LBS | SYSTOLIC BLOOD PRESSURE: 134 MMHG | HEIGHT: 62 IN | BODY MASS INDEX: 31.43 KG/M2

## 2024-06-07 DIAGNOSIS — Z79.4 TYPE 2 DIABETES MELLITUS WITH HYPERGLYCEMIA, WITH LONG-TERM CURRENT USE OF INSULIN (HCC): Primary | ICD-10-CM

## 2024-06-07 DIAGNOSIS — N18.30 STAGE 3 CHRONIC KIDNEY DISEASE DUE TO TYPE 2 DIABETES MELLITUS (HCC): ICD-10-CM

## 2024-06-07 DIAGNOSIS — I10 PRIMARY HYPERTENSION: ICD-10-CM

## 2024-06-07 DIAGNOSIS — E78.2 MIXED HYPERLIPIDEMIA: ICD-10-CM

## 2024-06-07 DIAGNOSIS — E11.22 STAGE 3 CHRONIC KIDNEY DISEASE DUE TO TYPE 2 DIABETES MELLITUS (HCC): ICD-10-CM

## 2024-06-07 DIAGNOSIS — E11.65 TYPE 2 DIABETES MELLITUS WITH HYPERGLYCEMIA, WITH LONG-TERM CURRENT USE OF INSULIN (HCC): Primary | ICD-10-CM

## 2024-06-07 LAB — GLUCOSE, POC: 179 MG/DL

## 2024-06-07 PROCEDURE — 1123F ACP DISCUSS/DSCN MKR DOCD: CPT | Performed by: STUDENT IN AN ORGANIZED HEALTH CARE EDUCATION/TRAINING PROGRAM

## 2024-06-07 PROCEDURE — G8400 PT W/DXA NO RESULTS DOC: HCPCS | Performed by: STUDENT IN AN ORGANIZED HEALTH CARE EDUCATION/TRAINING PROGRAM

## 2024-06-07 PROCEDURE — 99212 OFFICE O/P EST SF 10 MIN: CPT | Performed by: STUDENT IN AN ORGANIZED HEALTH CARE EDUCATION/TRAINING PROGRAM

## 2024-06-07 PROCEDURE — 1036F TOBACCO NON-USER: CPT | Performed by: STUDENT IN AN ORGANIZED HEALTH CARE EDUCATION/TRAINING PROGRAM

## 2024-06-07 PROCEDURE — 3078F DIAST BP <80 MM HG: CPT | Performed by: STUDENT IN AN ORGANIZED HEALTH CARE EDUCATION/TRAINING PROGRAM

## 2024-06-07 PROCEDURE — 1090F PRES/ABSN URINE INCON ASSESS: CPT | Performed by: STUDENT IN AN ORGANIZED HEALTH CARE EDUCATION/TRAINING PROGRAM

## 2024-06-07 PROCEDURE — G8417 CALC BMI ABV UP PARAM F/U: HCPCS | Performed by: STUDENT IN AN ORGANIZED HEALTH CARE EDUCATION/TRAINING PROGRAM

## 2024-06-07 PROCEDURE — 3075F SYST BP GE 130 - 139MM HG: CPT | Performed by: STUDENT IN AN ORGANIZED HEALTH CARE EDUCATION/TRAINING PROGRAM

## 2024-06-07 PROCEDURE — 82962 GLUCOSE BLOOD TEST: CPT | Performed by: STUDENT IN AN ORGANIZED HEALTH CARE EDUCATION/TRAINING PROGRAM

## 2024-06-07 PROCEDURE — G8427 DOCREV CUR MEDS BY ELIG CLIN: HCPCS | Performed by: STUDENT IN AN ORGANIZED HEALTH CARE EDUCATION/TRAINING PROGRAM

## 2024-06-07 RX ORDER — IRBESARTAN AND HYDROCHLOROTHIAZIDE 150; 12.5 MG/1; MG/1
1 TABLET, FILM COATED ORAL DAILY
COMMUNITY

## 2024-06-07 NOTE — PROGRESS NOTES
Chief Complaint   Patient presents with    Follow-up    Diabetes     BP (!) 146/64 (Site: Left Upper Arm, Position: Sitting, Cuff Size: Large Adult)   Pulse 61   Temp 97.3 °F (36.3 °C) (Temporal)   Resp 18   Ht 1.575 m (5' 2\")   Wt 77.5 kg (170 lb 12.8 oz)   SpO2 99%   BMI 31.24 kg/m²     /60 (Site: Left Upper Arm, Position: Sitting, Cuff Size: Large Adult)   Pulse 61   Temp 97.3 °F (36.3 °C) (Temporal)   Resp 18   Ht 1.575 m (5' 2\")   Wt 77.5 kg (170 lb 12.8 oz)   SpO2 99%   BMI 31.24 kg/m²     
CAD: no  · CVA: no  · PVD: no  · Amputations: no   · Retinopathy: no; last exam was 9/22.   · Gastropathy: no  · Nephropathy: ckd stage 3  · Neuropathy: no    Medications:  · Statin: atorvastatin 20, fenofibrate 160 mg  · ACE-I: no  · ASA: yes    · Diabetes education: no       No results found for this visit on 06/07/24.        Past Medical History:   Diagnosis Date    Arthritis     Chronic kidney disease     Diabetes (HCC)     Gout     High cholesterol     Kidney disease     Type 2 diabetes mellitus without complication (HCC)        Past Surgical History:   Procedure Laterality Date    BREAST BIOPSY Right 10 yrs ago    benign    COLONOSCOPY N/A 6/30/2023    COLONOSCOPY DIAGNOSTIC performed by Columba Ashley MD at [facility] END[facility]COPY    COLONOSCOPY  6/30/2023    COLONOSCOPY POLYPECTOMY SNARE/COLD BIOPSY performed by Columba Ashley MD at [facility] ENDOSCOPY    GI      intestinal    HYSTERECTOMY (CERVIX STATUS UNKNOWN)      [facility]         Social History     Socioeconomic History    Marital status:      Spouse name: Not on file    Number of children: Not on file    Years of education: Not on file    Highest education level: Not on file   Occupational History    Not on file   Tobacco Use    Smoking status: Never    Smokeless tobacco: Never   Vaping Use    Vaping Use: Never used   Substance and Sexual Activity    Alcohol use: Not Currently    Drug use: Never    Sexual activity: Not on file     Comment: denies h/o STIs or abnml pap smears   Other Topics Concern    Not on file   Social History Narrative    Not on file     Social Determinants of Health     Financial Resource Strain: Not on file   Food Insecurity: Not on file   Transportation Needs: Not on file   Physical Activity: Insufficiently Active (5/9/2022)    Exercise Vital Sign     Days of Exercise per Week: 7 days     Minutes of Exercise per Session: 20 min   Stress: Not on file   Social Connections: Not on file   Intimate Partner Violence: Not At Risk (5/9/2022)    Humiliation,

## 2024-06-28 ENCOUNTER — HOSPITAL ENCOUNTER (OUTPATIENT)
Facility: HOSPITAL | Age: 79
End: 2024-06-28
Attending: INTERNAL MEDICINE
Payer: MEDICARE

## 2024-06-28 DIAGNOSIS — Z12.31 SCREENING MAMMOGRAM, ENCOUNTER FOR: ICD-10-CM

## 2024-06-28 PROCEDURE — 77063 BREAST TOMOSYNTHESIS BI: CPT

## 2024-09-10 LAB — HBA1C MFR BLD HPLC: 8.1 %

## 2024-09-13 ENCOUNTER — OFFICE VISIT (OUTPATIENT)
Age: 79
End: 2024-09-13
Payer: MEDICARE

## 2024-09-13 VITALS
DIASTOLIC BLOOD PRESSURE: 64 MMHG | SYSTOLIC BLOOD PRESSURE: 163 MMHG | WEIGHT: 170 LBS | RESPIRATION RATE: 16 BRPM | BODY MASS INDEX: 31.28 KG/M2 | OXYGEN SATURATION: 96 % | HEART RATE: 66 BPM | HEIGHT: 62 IN

## 2024-09-13 DIAGNOSIS — Z12.72 SCREENING FOR MALIGNANT NEOPLASM OF VAGINA AFTER TOTAL HYSTERECTOMY: ICD-10-CM

## 2024-09-13 DIAGNOSIS — Z01.419 ENCOUNTER FOR GYNECOLOGICAL EXAMINATION (GENERAL) (ROUTINE) WITHOUT ABNORMAL FINDINGS: Primary | ICD-10-CM

## 2024-09-13 DIAGNOSIS — Z90.710 SCREENING FOR MALIGNANT NEOPLASM OF VAGINA AFTER TOTAL HYSTERECTOMY: ICD-10-CM

## 2024-09-13 PROCEDURE — 99397 PER PM REEVAL EST PAT 65+ YR: CPT | Performed by: OBSTETRICS & GYNECOLOGY

## 2024-09-13 RX ORDER — LEFLUNOMIDE 20 MG/1
20 TABLET ORAL DAILY
COMMUNITY

## 2024-09-13 RX ORDER — REPAGLINIDE 2 MG/1
2 TABLET ORAL
COMMUNITY

## 2024-09-13 ASSESSMENT — ANXIETY QUESTIONNAIRES
4. TROUBLE RELAXING: NOT AT ALL
1. FEELING NERVOUS, ANXIOUS, OR ON EDGE: NOT AT ALL
3. WORRYING TOO MUCH ABOUT DIFFERENT THINGS: NOT AT ALL
6. BECOMING EASILY ANNOYED OR IRRITABLE: NOT AT ALL
IF YOU CHECKED OFF ANY PROBLEMS ON THIS QUESTIONNAIRE, HOW DIFFICULT HAVE THESE PROBLEMS MADE IT FOR YOU TO DO YOUR WORK, TAKE CARE OF THINGS AT HOME, OR GET ALONG WITH OTHER PEOPLE: NOT DIFFICULT AT ALL
5. BEING SO RESTLESS THAT IT IS HARD TO SIT STILL: NOT AT ALL
2. NOT BEING ABLE TO STOP OR CONTROL WORRYING: NOT AT ALL
7. FEELING AFRAID AS IF SOMETHING AWFUL MIGHT HAPPEN: NOT AT ALL
GAD7 TOTAL SCORE: 0

## 2024-09-13 ASSESSMENT — PATIENT HEALTH QUESTIONNAIRE - PHQ9
SUM OF ALL RESPONSES TO PHQ9 QUESTIONS 1 & 2: 0
1. LITTLE INTEREST OR PLEASURE IN DOING THINGS: NOT AT ALL
SUM OF ALL RESPONSES TO PHQ QUESTIONS 1-9: 0
SUM OF ALL RESPONSES TO PHQ QUESTIONS 1-9: 0
2. FEELING DOWN, DEPRESSED OR HOPELESS: NOT AT ALL
SUM OF ALL RESPONSES TO PHQ QUESTIONS 1-9: 0
SUM OF ALL RESPONSES TO PHQ QUESTIONS 1-9: 0

## 2024-09-17 LAB
., LABCORP: NORMAL
CYTOLOGIST CVX/VAG CYTO: NORMAL
CYTOLOGY CVX/VAG DOC CYTO: NORMAL
CYTOLOGY CVX/VAG DOC THIN PREP: NORMAL
DX ICD CODE: NORMAL
Lab: NORMAL
OTHER STN SPEC: NORMAL
STAT OF ADQ CVX/VAG CYTO-IMP: NORMAL

## 2024-09-23 ENCOUNTER — OFFICE VISIT (OUTPATIENT)
Age: 79
End: 2024-09-23
Payer: MEDICARE

## 2024-09-23 VITALS
HEART RATE: 66 BPM | TEMPERATURE: 97.8 F | WEIGHT: 168.9 LBS | BODY MASS INDEX: 31.08 KG/M2 | OXYGEN SATURATION: 97 % | SYSTOLIC BLOOD PRESSURE: 112 MMHG | DIASTOLIC BLOOD PRESSURE: 62 MMHG | RESPIRATION RATE: 16 BRPM | HEIGHT: 62 IN

## 2024-09-23 DIAGNOSIS — Z79.4 TYPE 2 DIABETES MELLITUS WITH HYPERGLYCEMIA, WITH LONG-TERM CURRENT USE OF INSULIN (HCC): Primary | ICD-10-CM

## 2024-09-23 DIAGNOSIS — E11.65 TYPE 2 DIABETES MELLITUS WITH HYPERGLYCEMIA, WITH LONG-TERM CURRENT USE OF INSULIN (HCC): Primary | ICD-10-CM

## 2024-09-23 DIAGNOSIS — N18.4 STAGE 4 CHRONIC KIDNEY DISEASE (HCC): ICD-10-CM

## 2024-09-23 DIAGNOSIS — E78.2 MIXED HYPERLIPIDEMIA: ICD-10-CM

## 2024-09-23 DIAGNOSIS — I10 PRIMARY HYPERTENSION: ICD-10-CM

## 2024-09-23 LAB
GLUCOSE, POC: 81 MG/DL
HBA1C MFR BLD: NORMAL %

## 2024-09-23 PROCEDURE — 1090F PRES/ABSN URINE INCON ASSESS: CPT | Performed by: STUDENT IN AN ORGANIZED HEALTH CARE EDUCATION/TRAINING PROGRAM

## 2024-09-23 PROCEDURE — 3074F SYST BP LT 130 MM HG: CPT | Performed by: STUDENT IN AN ORGANIZED HEALTH CARE EDUCATION/TRAINING PROGRAM

## 2024-09-23 PROCEDURE — 82962 GLUCOSE BLOOD TEST: CPT | Performed by: STUDENT IN AN ORGANIZED HEALTH CARE EDUCATION/TRAINING PROGRAM

## 2024-09-23 PROCEDURE — 1036F TOBACCO NON-USER: CPT | Performed by: STUDENT IN AN ORGANIZED HEALTH CARE EDUCATION/TRAINING PROGRAM

## 2024-09-23 PROCEDURE — 3078F DIAST BP <80 MM HG: CPT | Performed by: STUDENT IN AN ORGANIZED HEALTH CARE EDUCATION/TRAINING PROGRAM

## 2024-09-23 PROCEDURE — G8417 CALC BMI ABV UP PARAM F/U: HCPCS | Performed by: STUDENT IN AN ORGANIZED HEALTH CARE EDUCATION/TRAINING PROGRAM

## 2024-09-23 PROCEDURE — G2211 COMPLEX E/M VISIT ADD ON: HCPCS | Performed by: STUDENT IN AN ORGANIZED HEALTH CARE EDUCATION/TRAINING PROGRAM

## 2024-09-23 PROCEDURE — G8427 DOCREV CUR MEDS BY ELIG CLIN: HCPCS | Performed by: STUDENT IN AN ORGANIZED HEALTH CARE EDUCATION/TRAINING PROGRAM

## 2024-09-23 PROCEDURE — 83036 HEMOGLOBIN GLYCOSYLATED A1C: CPT | Performed by: STUDENT IN AN ORGANIZED HEALTH CARE EDUCATION/TRAINING PROGRAM

## 2024-09-23 PROCEDURE — 1123F ACP DISCUSS/DSCN MKR DOCD: CPT | Performed by: STUDENT IN AN ORGANIZED HEALTH CARE EDUCATION/TRAINING PROGRAM

## 2024-09-23 PROCEDURE — 99214 OFFICE O/P EST MOD 30 MIN: CPT | Performed by: STUDENT IN AN ORGANIZED HEALTH CARE EDUCATION/TRAINING PROGRAM

## 2024-09-23 PROCEDURE — G8400 PT W/DXA NO RESULTS DOC: HCPCS | Performed by: STUDENT IN AN ORGANIZED HEALTH CARE EDUCATION/TRAINING PROGRAM

## 2024-09-23 RX ORDER — INSULIN GLARGINE AND LIXISENATIDE 100; 33 U/ML; UG/ML
22 INJECTION, SOLUTION SUBCUTANEOUS DAILY
Qty: 10 ADJUSTABLE DOSE PRE-FILLED PEN SYRINGE | Refills: 1 | Status: SHIPPED | OUTPATIENT
Start: 2024-09-23

## 2024-09-23 RX ORDER — FERROUS SULFATE 325(65) MG
325 TABLET ORAL
COMMUNITY

## 2024-09-23 RX ORDER — REPAGLINIDE 1 MG/1
1 TABLET ORAL
COMMUNITY
Start: 2024-09-16

## 2024-09-23 RX ORDER — OMEPRAZOLE 40 MG/1
CAPSULE, DELAYED RELEASE ORAL DAILY
COMMUNITY
Start: 2024-06-25

## 2024-09-23 RX ORDER — PIOGLITAZONEHYDROCHLORIDE 15 MG/1
TABLET ORAL
COMMUNITY
Start: 2024-09-22

## 2024-09-23 RX ORDER — GOLIMUMAB 50 MG/4ML
SOLUTION INTRAVENOUS
COMMUNITY
Start: 2024-07-29

## 2024-09-23 RX ORDER — MULTIVIT-MIN/IRON/FOLIC ACID/K 18-600-40
CAPSULE ORAL
COMMUNITY

## 2024-11-19 ENCOUNTER — OFFICE VISIT (OUTPATIENT)
Age: 79
End: 2024-11-19

## 2024-11-19 VITALS
BODY MASS INDEX: 32.39 KG/M2 | OXYGEN SATURATION: 95 % | RESPIRATION RATE: 18 BRPM | WEIGHT: 176 LBS | DIASTOLIC BLOOD PRESSURE: 70 MMHG | TEMPERATURE: 97 F | HEIGHT: 62 IN | HEART RATE: 58 BPM | SYSTOLIC BLOOD PRESSURE: 133 MMHG

## 2024-11-19 DIAGNOSIS — Z86.15 HISTORY OF LATENT TUBERCULOSIS: Primary | ICD-10-CM

## 2024-11-19 DIAGNOSIS — R91.8 PULMONARY NODULES: ICD-10-CM

## 2024-11-19 NOTE — PROGRESS NOTES
\"Have you been to the ER, urgent care clinic since your last visit?  Hospitalized since your last visit?\"    NO    “Have you seen or consulted any other health care providers outside our system since your last visit?”    YES - When: approximately 10/2024 ago.  Where and Why: Rheumatology for follow up.    Chief Complaint   Patient presents with    New Patient     Abnormal lung finding     /70 (Site: Right Upper Arm, Position: Sitting, Cuff Size: Large Adult)   Pulse 58   Temp 97 °F (36.1 °C) (Temporal)   Resp 18   Ht 1.575 m (5' 2\")   Wt 79.8 kg (176 lb)   SpO2 95%   BMI 32.19 kg/m²

## 2024-11-30 ASSESSMENT — ENCOUNTER SYMPTOMS
COUGH: 0
SHORTNESS OF BREATH: 0
CHEST TIGHTNESS: 0

## 2024-12-13 ENCOUNTER — HOSPITAL ENCOUNTER (OUTPATIENT)
Facility: HOSPITAL | Age: 79
Discharge: HOME | End: 2024-12-16
Attending: INTERNAL MEDICINE
Payer: MEDICARE

## 2024-12-13 DIAGNOSIS — R91.8 PULMONARY NODULES: ICD-10-CM

## 2024-12-13 DIAGNOSIS — Z86.15 HISTORY OF LATENT TUBERCULOSIS: ICD-10-CM

## 2024-12-13 PROCEDURE — 71250 CT THORAX DX C-: CPT

## 2024-12-19 NOTE — RESULT ENCOUNTER NOTE
Patient status post chemoprophylaxis for latent TB (positive Quantiferon) who apparently had screening CXR which showed pulmonary nodules.  CT Chest was ordered and showed OLD granulomatous disease but no evidence for active TB.   No treatment or further diagnostic workup needed.

## 2025-01-16 ENCOUNTER — OFFICE VISIT (OUTPATIENT)
Age: 80
End: 2025-01-16

## 2025-01-16 VITALS
WEIGHT: 177.3 LBS | HEART RATE: 60 BPM | DIASTOLIC BLOOD PRESSURE: 72 MMHG | BODY MASS INDEX: 32.63 KG/M2 | TEMPERATURE: 98.1 F | HEIGHT: 62 IN | RESPIRATION RATE: 16 BRPM | SYSTOLIC BLOOD PRESSURE: 141 MMHG

## 2025-01-16 DIAGNOSIS — E78.2 MIXED HYPERLIPIDEMIA: ICD-10-CM

## 2025-01-16 DIAGNOSIS — I10 PRIMARY HYPERTENSION: ICD-10-CM

## 2025-01-16 DIAGNOSIS — E11.65 TYPE 2 DIABETES MELLITUS WITH HYPERGLYCEMIA, WITH LONG-TERM CURRENT USE OF INSULIN (HCC): Primary | ICD-10-CM

## 2025-01-16 DIAGNOSIS — Z79.4 TYPE 2 DIABETES MELLITUS WITH HYPERGLYCEMIA, WITH LONG-TERM CURRENT USE OF INSULIN (HCC): Primary | ICD-10-CM

## 2025-01-16 NOTE — PROGRESS NOTES
\"Have you been to the ER, urgent care clinic since your last visit?  Hospitalized since your last visit?\"    NO    “Have you seen or consulted any other health care providers outside our system since your last visit?”    YES - When: approximately 1/10/25 ago.  Where and Why: PCP for follow up.        Chief Complaint   Patient presents with    Follow-up    Diabetes     BP (!) 141/72 (Site: Right Upper Arm, Position: Sitting, Cuff Size: Medium Adult)   Pulse 60   Temp 98.1 °F (36.7 °C) (Temporal)   Resp 16   Ht 1.575 m (5' 2\")   Wt 80.4 kg (177 lb 4.8 oz)   BMI 32.43 kg/m²          
exam :yes         2. Hyperlipidemia  She is on atorvastatin 20 mg orally daily  She is also on fenofibrate 160 mg-   Last cholesterol panel is from 9/15/23 - LDL - 69, HDL -62, Tg -70   Lipid panel 3- T chol-162, LDL -86,   9-23-24 she is off fenofibrate   1-2025   elevated   on Lipitor 20 mg daily       3. Hypertension  Slightly elevated today   on irbesartan - hctz and amlodipine       4. Ckd 4   GFR in 9/24 -28   Following with nephrology Dr. Connelly     [facility] in 3 months         I have discussed the diagnosis with the patient and the intended plan .  The patient has received an after-visit summary and questions were answered concerning future plans.  I have discussed medication side effects .    Thank you for allowing me to participate in the care of this patient.    Abida Henry      There are no Patient Instructions on file for this visit.  No follow-up provider specified.          Patient /caregiver verbalized understanding .  Voice-recognition software was used to generate this report, which may result in some phonetic-based errors in the grammar and contents.  Even though attempts were made to correct all the mistakes, some may have been missed and remained in the body of the report.

## 2025-03-01 ENCOUNTER — HOSPITAL ENCOUNTER (EMERGENCY)
Facility: HOSPITAL | Age: 80
Discharge: HOME | End: 2025-03-01
Payer: MEDICARE

## 2025-03-01 ENCOUNTER — APPOINTMENT (OUTPATIENT)
Facility: HOSPITAL | Age: 80
End: 2025-03-01
Payer: MEDICARE

## 2025-03-01 VITALS
OXYGEN SATURATION: 99 % | HEART RATE: 86 BPM | SYSTOLIC BLOOD PRESSURE: 143 MMHG | TEMPERATURE: 97.5 F | BODY MASS INDEX: 32.57 KG/M2 | WEIGHT: 177 LBS | HEIGHT: 62 IN | DIASTOLIC BLOOD PRESSURE: 57 MMHG | RESPIRATION RATE: 19 BRPM

## 2025-03-01 DIAGNOSIS — M25.551 CHRONIC HIP PAIN, BILATERAL: Primary | ICD-10-CM

## 2025-03-01 DIAGNOSIS — G89.29 CHRONIC HIP PAIN, BILATERAL: Primary | ICD-10-CM

## 2025-03-01 DIAGNOSIS — R60.9 DEPENDENT EDEMA: ICD-10-CM

## 2025-03-01 DIAGNOSIS — M79.605 BILATERAL LEG PAIN: ICD-10-CM

## 2025-03-01 DIAGNOSIS — M79.604 BILATERAL LEG PAIN: ICD-10-CM

## 2025-03-01 DIAGNOSIS — M25.552 CHRONIC HIP PAIN, BILATERAL: Primary | ICD-10-CM

## 2025-03-01 LAB
ALBUMIN SERPL-MCNC: 3.3 G/DL (ref 3.5–5)
ALBUMIN SERPL-MCNC: 3.4 G/DL (ref 3.5–5)
ALBUMIN/GLOB SERPL: 0.7 (ref 1.1–2.2)
ALBUMIN/GLOB SERPL: 0.8 (ref 1.1–2.2)
ALP SERPL-CCNC: 109 U/L (ref 45–117)
ALP SERPL-CCNC: 98 U/L (ref 45–117)
ALT SERPL-CCNC: 22 U/L (ref 12–78)
ALT SERPL-CCNC: ABNORMAL U/L (ref 12–78)
ANION GAP SERPL CALC-SCNC: 0 MMOL/L (ref 2–12)
AST SERPL W P-5'-P-CCNC: ABNORMAL U/L (ref 15–37)
AST SERPL W P-5'-P-CCNC: ABNORMAL U/L (ref 15–37)
BASOPHILS # BLD: 0.04 K/UL (ref 0–0.1)
BASOPHILS NFR BLD: 0.5 % (ref 0–1)
BILIRUB DIRECT SERPL-MCNC: ABNORMAL MG/DL (ref 0–0.2)
BILIRUB SERPL-MCNC: 0.3 MG/DL (ref 0.2–1)
BILIRUB SERPL-MCNC: 0.3 MG/DL (ref 0.2–1)
BNP SERPL-MCNC: 107 PG/ML
BUN SERPL-MCNC: 26 MG/DL (ref 6–20)
BUN/CREAT SERPL: 18 (ref 12–20)
CA-I BLD-MCNC: 9.1 MG/DL (ref 8.5–10.1)
CHLORIDE SERPL-SCNC: 112 MMOL/L (ref 97–108)
CO2 SERPL-SCNC: 24 MMOL/L (ref 21–32)
CREAT SERPL-MCNC: 1.47 MG/DL (ref 0.55–1.02)
DIFFERENTIAL METHOD BLD: ABNORMAL
ECHO BSA: 1.87 M2
EKG ATRIAL RATE: 56 BPM
EKG DIAGNOSIS: NORMAL
EKG P AXIS: 41 DEGREES
EKG P-R INTERVAL: 158 MS
EKG Q-T INTERVAL: 396 MS
EKG QRS DURATION: 88 MS
EKG QTC CALCULATION (BAZETT): 382 MS
EKG R AXIS: -9 DEGREES
EKG T AXIS: 6 DEGREES
EKG VENTRICULAR RATE: 56 BPM
EOSINOPHIL # BLD: 0.13 K/UL (ref 0–0.4)
EOSINOPHIL NFR BLD: 1.8 % (ref 0–7)
ERYTHROCYTE [DISTWIDTH] IN BLOOD BY AUTOMATED COUNT: 16.8 % (ref 11.5–14.5)
GLOBULIN SER CALC-MCNC: 4.4 G/DL (ref 2–4)
GLOBULIN SER CALC-MCNC: 5 G/DL (ref 2–4)
GLUCOSE SERPL-MCNC: 117 MG/DL (ref 65–100)
HCT VFR BLD AUTO: 34.3 % (ref 35–47)
HGB BLD-MCNC: 10.6 G/DL (ref 11.5–16)
IMM GRANULOCYTES # BLD AUTO: 0.03 K/UL (ref 0–0.04)
IMM GRANULOCYTES NFR BLD AUTO: 0.4 % (ref 0–0.5)
LYMPHOCYTES # BLD: 3.95 K/UL (ref 0.8–3.5)
LYMPHOCYTES NFR BLD: 53.4 % (ref 12–49)
MCH RBC QN AUTO: 25.5 PG (ref 26–34)
MCHC RBC AUTO-ENTMCNC: 30.9 G/DL (ref 30–36.5)
MCV RBC AUTO: 82.5 FL (ref 80–99)
MONOCYTES # BLD: 0.52 K/UL (ref 0–1)
MONOCYTES NFR BLD: 7 % (ref 5–13)
NEUTS SEG # BLD: 2.73 K/UL (ref 1.8–8)
NEUTS SEG NFR BLD: 36.9 % (ref 32–75)
NRBC # BLD: 0 K/UL (ref 0–0.01)
NRBC BLD-RTO: 0 PER 100 WBC
PLATELET # BLD AUTO: 216 K/UL (ref 150–400)
POTASSIUM SERPL-SCNC: 3.9 MMOL/L (ref 3.5–5.1)
POTASSIUM SERPL-SCNC: ABNORMAL MMOL/L (ref 3.5–5.1)
POTASSIUM SERPL-SCNC: NORMAL MMOL/L (ref 3.5–5.1)
PROT SERPL-MCNC: 7.7 G/DL (ref 6.4–8.2)
PROT SERPL-MCNC: 8.4 G/DL (ref 6.4–8.2)
RBC # BLD AUTO: 4.16 M/UL (ref 3.8–5.2)
SODIUM SERPL-SCNC: 136 MMOL/L (ref 136–145)
TROPONIN I SERPL HS-MCNC: 7 NG/L (ref 0–51)
WBC # BLD AUTO: 7.4 K/UL (ref 3.6–11)

## 2025-03-01 PROCEDURE — 71045 X-RAY EXAM CHEST 1 VIEW: CPT

## 2025-03-01 PROCEDURE — 93970 EXTREMITY STUDY: CPT

## 2025-03-01 PROCEDURE — 93005 ELECTROCARDIOGRAM TRACING: CPT | Performed by: EMERGENCY MEDICINE

## 2025-03-01 PROCEDURE — 73522 X-RAY EXAM HIPS BI 3-4 VIEWS: CPT

## 2025-03-01 PROCEDURE — 85025 COMPLETE CBC W/AUTO DIFF WBC: CPT

## 2025-03-01 PROCEDURE — 80076 HEPATIC FUNCTION PANEL: CPT

## 2025-03-01 PROCEDURE — 84132 ASSAY OF SERUM POTASSIUM: CPT

## 2025-03-01 PROCEDURE — 83880 ASSAY OF NATRIURETIC PEPTIDE: CPT

## 2025-03-01 PROCEDURE — 84484 ASSAY OF TROPONIN QUANT: CPT

## 2025-03-01 PROCEDURE — 36415 COLL VENOUS BLD VENIPUNCTURE: CPT

## 2025-03-01 PROCEDURE — 80053 COMPREHEN METABOLIC PANEL: CPT

## 2025-03-01 PROCEDURE — 80048 BASIC METABOLIC PNL TOTAL CA: CPT

## 2025-03-01 PROCEDURE — 99285 EMERGENCY DEPT VISIT HI MDM: CPT

## 2025-03-01 RX ORDER — ACETAMINOPHEN 500 MG
500 TABLET ORAL EVERY 6 HOURS PRN
Qty: 30 TABLET | Refills: 0 | Status: SHIPPED | OUTPATIENT
Start: 2025-03-01

## 2025-03-01 RX ORDER — GABAPENTIN 300 MG/1
300 CAPSULE ORAL 2 TIMES DAILY
COMMUNITY
Start: 2025-02-27

## 2025-03-01 RX ORDER — TRAMADOL HYDROCHLORIDE 50 MG/1
50 TABLET ORAL EVERY 6 HOURS PRN
Qty: 9 TABLET | Refills: 0 | Status: SHIPPED | OUTPATIENT
Start: 2025-03-01 | End: 2025-03-04

## 2025-03-01 RX ORDER — LIDOCAINE 50 MG/G
1 PATCH TOPICAL DAILY
Qty: 10 PATCH | Refills: 0 | Status: SHIPPED | OUTPATIENT
Start: 2025-03-01 | End: 2025-03-11

## 2025-03-01 RX ORDER — LIDOCAINE 5% 5 G/100G
CREAM TOPICAL
COMMUNITY
Start: 2025-02-17

## 2025-03-01 ASSESSMENT — PAIN DESCRIPTION - LOCATION
LOCATION: HIP
LOCATION: LEG

## 2025-03-01 ASSESSMENT — PAIN DESCRIPTION - ORIENTATION
ORIENTATION: RIGHT;LEFT
ORIENTATION: RIGHT;LEFT

## 2025-03-01 ASSESSMENT — PAIN - FUNCTIONAL ASSESSMENT
PAIN_FUNCTIONAL_ASSESSMENT: 0-10
PAIN_FUNCTIONAL_ASSESSMENT: 0-10

## 2025-03-01 ASSESSMENT — LIFESTYLE VARIABLES
HOW OFTEN DO YOU HAVE A DRINK CONTAINING ALCOHOL: MONTHLY OR LESS
HOW MANY STANDARD DRINKS CONTAINING ALCOHOL DO YOU HAVE ON A TYPICAL DAY: 1 OR 2

## 2025-03-01 ASSESSMENT — PAIN SCALES - GENERAL
PAINLEVEL_OUTOF10: 5
PAINLEVEL_OUTOF10: 10

## 2025-03-01 ASSESSMENT — PAIN DESCRIPTION - PAIN TYPE: TYPE: ACUTE PAIN;CHRONIC PAIN

## 2025-03-01 NOTE — ED PROVIDER NOTES
[facility] EMERGENCY DEPT  EMERGENCY DEPARTMENT HISTORY AND PHYSICAL EXAM      Date: 3/1/2025  Patient Name: Queen STACI Barry  MRN: [id number]  YOB: 1945  Date of evaluation: 3/1/2025  Provider: Jennifer Siegel PA-C   Note Started: 1:23 PM EST 3/1/25    HISTORY OF PRESENT ILLNESS     Chief Complaint   Patient presents with    Hip Pain    Leg Swelling       History Provided By: Patient    HPI: Queen STACI Barry is a 79 y.o. female with past medical history listed below, presents for evaluation of bilateral hip pain radiating down her legs.  She also endorses some new leg swelling which has been going on for over a week.  She is seen by orthopedic doctor and placed on gabapentin without significant improvement of her symptoms.  She was also previously getting IV infusions through [facility] for arthritis which were discontinued a month ago. Denies any shortness of breath, difficulty breathing, nausea/vomiting, constipation/diarrhea, abdominal pain, rash, night sweats, or chest pain.     PAST MEDICAL HISTORY   Past Medical History:  Past Medical History:   Diagnosis Date    Arthritis     Chronic kidney disease     Diabetes (HCC)     Gout     High cholesterol     Kidney disease     Type 2 diabetes mellitus without complication (HCC)        Past Surgical History:  Past Surgical History:   Procedure Laterality Date    BREAST BIOPSY Right 10 yrs ago    benign    COLONOSCOPY N/A 6/30/2023    COLONOSCOPY DIAGNOSTIC performed by Columba Ashley MD at [facility] ENDOSCOPY    COLONOSCOPY  6/30/2023    COLONOSCOPY POLYPECTOMY SNARE/COLD BIOPSY performed by Columba Ashley MD at [facility] ENDOSCOPY    GI      intestinal    HYSTERECTOMY (CERVIX STATUS UNKNOWN)      [facility]        Family History:  Family History   Problem Relation Age of Onset    Breast Cancer Sister         unsure if genetic testing was performed    Ovarian Cancer Sister     Diabetes Father        Social History:  Social History     Tobacco Use    Smoking status: Never    Smokeless

## 2025-03-01 NOTE — DISCHARGE INSTRUCTIONS
Thank you!  Thank you for allowing me to care for you in the emergency department. It is my goal to provide you with excellent care. If you have not received excellent quality care, please ask to speak to the nurse manager. Please fill out the survey that will come to you by mail or email since we listen to your feedback!     Below you will find a list of your tests from today's visit.  Should you have any questions, please do not hesitate to call the emergency department.    Labs  Recent Results (from the past 12 hour(s))   CBC with Auto Differential    Collection Time: 03/01/25 10:43 AM   Result Value Ref Range    WBC 7.4 3.6 - 11.0 K/uL    RBC 4.16 3.80 - 5.20 M/uL    Hemoglobin 10.6 (L) 11.5 - 16.0 g/dL    Hematocrit 34.3 (L) 35.0 - 47.0 %    MCV 82.5 80.0 - 99.0 FL    MCH 25.5 (L) 26.0 - 34.0 PG    MCHC 30.9 30.0 - 36.5 g/dL    RDW 16.8 (H) 11.5 - 14.5 %    Platelets 216 150 - 400 K/uL    Nucleated RBCs 0.0 0.0  WBC    nRBC 0.00 0.00 - 0.01 K/uL    Neutrophils % 36.9 32.0 - 75.0 %    Lymphocytes % 53.4 (H) 12.0 - 49.0 %    Monocytes % 7.0 5.0 - 13.0 %    Eosinophils % 1.8 0.0 - 7.0 %    Basophils % 0.5 0.0 - 1.0 %    Immature Granulocytes % 0.4 0 - 0.5 %    Neutrophils Absolute 2.73 1.80 - 8.00 K/UL    Lymphocytes Absolute 3.95 (H) 0.80 - 3.50 K/UL    Monocytes Absolute 0.52 0.00 - 1.00 K/UL    Eosinophils Absolute 0.13 0.00 - 0.40 K/UL    Basophils Absolute 0.04 0.00 - 0.10 K/UL    Immature Granulocytes Absolute 0.03 0.00 - 0.04 K/UL    Differential Type AUTOMATED     Troponin    Collection Time: 03/01/25 10:43 AM   Result Value Ref Range    Troponin, High Sensitivity 7 0 - 51 ng/L   Comprehensive Metabolic Panel    Collection Time: 03/01/25 10:43 AM   Result Value Ref Range    Sodium 136 136 - 145 mmol/L    Potassium Hemolyzed, Recollection Recommended 3.5 - 5.1 mmol/L    Chloride 112 (H) 97 - 108 mmol/L    CO2 24 21 - 32 mmol/L    Anion Gap 0 (L) 2 - 12 mmol/L    Glucose 117 (H) 65 - 100 mg/dL    BUN

## 2025-03-01 NOTE — ED TRIAGE NOTES
Bilat hip pain radiating down legs, acute on chronic. Hx of arthritis. Endorses IV infusion for arthritis in past stopped. Denies CP/SoB/Dizziness. Bilat pitting edema noted in legs.

## 2025-03-03 LAB — ECHO BSA: 1.87 M2

## 2025-04-07 ENCOUNTER — TRANSCRIBE ORDERS (OUTPATIENT)
Facility: HOSPITAL | Age: 80
End: 2025-04-07

## 2025-04-07 DIAGNOSIS — M79.18 PAIN OF THIGH MUSCLE: Primary | ICD-10-CM

## 2025-04-07 DIAGNOSIS — Z78.9: ICD-10-CM

## 2025-04-07 DIAGNOSIS — Z79.620 VISIT FOR MONITORING SIMPONI THERAPY: ICD-10-CM

## 2025-04-07 DIAGNOSIS — Z51.81 VISIT FOR MONITORING SIMPONI THERAPY: ICD-10-CM

## 2025-04-14 ENCOUNTER — HOSPITAL ENCOUNTER (OUTPATIENT)
Facility: HOSPITAL | Age: 80
Discharge: HOME | End: 2025-04-17
Attending: INTERNAL MEDICINE
Payer: MEDICARE

## 2025-04-14 DIAGNOSIS — Z79.620 VISIT FOR MONITORING SIMPONI THERAPY: ICD-10-CM

## 2025-04-14 DIAGNOSIS — Z78.9: ICD-10-CM

## 2025-04-14 DIAGNOSIS — Z51.81 VISIT FOR MONITORING SIMPONI THERAPY: ICD-10-CM

## 2025-04-14 DIAGNOSIS — M79.18 PAIN OF THIGH MUSCLE: ICD-10-CM

## 2025-04-14 PROCEDURE — 73718 MRI LOWER EXTREMITY W/O DYE: CPT

## 2025-04-15 ENCOUNTER — HOSPITAL ENCOUNTER (OUTPATIENT)
Facility: HOSPITAL | Age: 80
Discharge: HOME | End: 2025-04-18
Attending: INTERNAL MEDICINE
Payer: MEDICARE

## 2025-04-15 DIAGNOSIS — M79.604 LEG PAIN, BILATERAL: ICD-10-CM

## 2025-04-15 DIAGNOSIS — M79.605 LEG PAIN, BILATERAL: ICD-10-CM

## 2025-04-15 DIAGNOSIS — Z13.820 ENCOUNTER FOR SCREENING FOR OSTEOPOROSIS: ICD-10-CM

## 2025-04-15 DIAGNOSIS — Z78.0 ASYMPTOMATIC MENOPAUSAL STATE: ICD-10-CM

## 2025-04-15 PROCEDURE — 93923 UPR/LXTR ART STDY 3+ LVLS: CPT

## 2025-04-15 PROCEDURE — 77080 DXA BONE DENSITY AXIAL: CPT

## 2025-04-17 LAB
VAS LEFT ABI: 1.12
VAS LEFT ANKLE BP: 208 MMHG
VAS LEFT ARM BP: 183 MMHG
VAS LEFT DORSALIS PEDIS BP: 196 MMHG
VAS LEFT PTA BP: 208 MMHG
VAS LEFT TBI: 0.61
VAS LEFT TOE PRESSURE: 113 MMHG
VAS RIGHT ABI: 1.15
VAS RIGHT ANKLE BP: 212 MMHG
VAS RIGHT ARM BP: 185 MMHG
VAS RIGHT DORSALIS PEDIS BP: 181 MMHG
VAS RIGHT PTA BP: 212 MMHG
VAS RIGHT TBI: 0.8
VAS RIGHT TOE PRESSURE: 148 MMHG

## 2025-04-21 NOTE — PROGRESS NOTES
Yohana Liriano is a 78 y.o. female, , No LMP recorded. Patient has had a hysterectomy. , who presents today for the following:  Chief Complaint   Patient presents with    Annual Exam     No complaints        No Known Allergies    Current Outpatient Medications   Medication Sig Dispense Refill    famciclovir (FAMVIR) 500 MG tablet TAKE 1 TABLET BY MOUTH THREE TIMES DAILY FOR 7 DAYS      gabapentin (NEURONTIN) 300 MG capsule Take 1 capsule by mouth 2 times daily. Max Daily Amount: 600 mg      SIMPONI ARIA 50 MG/4ML SOLN INFUSE 138 MG IV EVERY 8 WEEKS      triamcinolone (ARISTOCORT) 0.5 % cream APPLY A SMALL AMOUNT TOPICALLY THREE TIMES A DAY TO LOWER EXTREMITY      BD PEN NEEDLE MARINA 2ND GEN 32G X 4 MM MISC USE ONE NEEDLE EVERY DAY      allopurinol (ZYLOPRIM) 100 MG tablet Take 1 tablet by mouth daily      amLODIPine (NORVASC) 5 MG tablet Take 1 tablet by mouth daily      atorvastatin (LIPITOR) 20 MG tablet Take 1 tablet by mouth daily      diclofenac sodium (VOLTAREN) 1 % GEL APPLY 2 GRAMS TOPICALLY TO THE AFFECTED AREA TWICE DAILY AS NEEDED      fenofibrate (TRIGLIDE) 160 MG tablet Take 1 tablet by mouth nightly      blood glucose test strips (ASCENSIA AUTODISC VI;ONE TOUCH ULTRA TEST VI) strip AS DIRECTED      Insulin Glargine-Lixisenatide (SOLIQUA) 100-33 UNT-MCG/ML SOPN Inject 24 Units into the skin daily      irbesartan-hydroCHLOROthiazide (AVALIDE) 150-12.5 MG per tablet Take 1 tablet by mouth daily      isoniazid (NYDRAZID) 300 MG tablet Take 1 tablet by mouth daily      leflunomide (ARAVA) 20 MG tablet Take 1 tablet by mouth daily      pioglitazone (ACTOS) 15 MG tablet Take 1 tablet by mouth daily      repaglinide (PRANDIN) 2 MG tablet Take 1 tablet by mouth 3 times daily (before meals)      SITagliptin (JANUVIA) 50 MG tablet Take 1 tablet by mouth daily       No current facility-administered medications for this visit.          Past Medical History:   Diagnosis Date    Arthritis    
locker 7/Clothing

## 2025-04-25 RX ORDER — BLOOD SUGAR DIAGNOSTIC
STRIP MISCELLANEOUS
Qty: 300 EACH | Refills: 3 | Status: SHIPPED | OUTPATIENT
Start: 2025-04-25

## 2025-05-03 PROBLEM — M79.604 LEG PAIN, BILATERAL: Status: ACTIVE | Noted: 2025-05-03

## 2025-05-03 PROBLEM — M79.605 LEG PAIN, BILATERAL: Status: ACTIVE | Noted: 2025-05-03

## 2025-07-02 ENCOUNTER — TRANSCRIBE ORDERS (OUTPATIENT)
Facility: HOSPITAL | Age: 80
End: 2025-07-02

## 2025-07-02 DIAGNOSIS — Z12.31 VISIT FOR SCREENING MAMMOGRAM: Primary | ICD-10-CM

## 2025-08-14 ENCOUNTER — HOSPITAL ENCOUNTER (OUTPATIENT)
Facility: HOSPITAL | Age: 80
Discharge: HOME | End: 2025-08-17
Attending: INTERNAL MEDICINE
Payer: MEDICARE

## 2025-08-14 DIAGNOSIS — Z12.31 VISIT FOR SCREENING MAMMOGRAM: ICD-10-CM

## 2025-08-14 PROCEDURE — 77067 SCR MAMMO BI INCL CAD: CPT

## (undated) DEVICE — FORCEPS BX L240CM JAW DIA2.4MM ORNG L CAP W/ NDL DISP RAD

## (undated) DEVICE — KIT COLON WITH 1.1 OZ ORCA HYDRA SEAL 2 GOWN ADAPT SECONDARY

## (undated) DEVICE — MASK ANES INF SZ 2 PREM TAIL VLV INFL PRT UNSCENTED SGL PT

## (undated) DEVICE — CANNULA NSL O2 AD 7 FT END-TIDAL CARBON DIOX VENTFLO